# Patient Record
Sex: FEMALE | Race: WHITE | NOT HISPANIC OR LATINO
[De-identification: names, ages, dates, MRNs, and addresses within clinical notes are randomized per-mention and may not be internally consistent; named-entity substitution may affect disease eponyms.]

---

## 2017-01-13 ENCOUNTER — APPOINTMENT (OUTPATIENT)
Dept: INTERNAL MEDICINE | Facility: CLINIC | Age: 67
End: 2017-01-13

## 2017-04-14 ENCOUNTER — NON-APPOINTMENT (OUTPATIENT)
Age: 67
End: 2017-04-14

## 2017-04-14 ENCOUNTER — LABORATORY RESULT (OUTPATIENT)
Age: 67
End: 2017-04-14

## 2017-04-14 ENCOUNTER — APPOINTMENT (OUTPATIENT)
Dept: INTERNAL MEDICINE | Facility: CLINIC | Age: 67
End: 2017-04-14

## 2017-04-14 VITALS
BODY MASS INDEX: 27.63 KG/M2 | OXYGEN SATURATION: 98 % | DIASTOLIC BLOOD PRESSURE: 70 MMHG | SYSTOLIC BLOOD PRESSURE: 110 MMHG | HEIGHT: 62.5 IN | HEART RATE: 76 BPM | WEIGHT: 154 LBS | TEMPERATURE: 98.2 F

## 2017-04-14 DIAGNOSIS — Z87.09 PERSONAL HISTORY OF OTHER DISEASES OF THE RESPIRATORY SYSTEM: ICD-10-CM

## 2017-04-14 DIAGNOSIS — Z86.69 PERSONAL HISTORY OF OTHER DISEASES OF THE NERVOUS SYSTEM AND SENSE ORGANS: ICD-10-CM

## 2017-04-14 DIAGNOSIS — J06.9 ACUTE UPPER RESPIRATORY INFECTION, UNSPECIFIED: ICD-10-CM

## 2017-04-14 DIAGNOSIS — N39.0 URINARY TRACT INFECTION, SITE NOT SPECIFIED: ICD-10-CM

## 2017-04-14 LAB
CREAT SPEC-SCNC: NORMAL
GLUCOSE UR-MCNC: NORMAL
HGB UR QL STRIP.AUTO: NORMAL
KETONES UR-MCNC: NORMAL
LEUKOCYTE ESTERASE UR QL STRIP: NORMAL
NITRITE UR QL STRIP: NORMAL
PH UR STRIP: 5.5
PROT UR STRIP-MCNC: NORMAL
SP GR UR STRIP: >=1.03

## 2017-04-15 VITALS — SYSTOLIC BLOOD PRESSURE: 120 MMHG | DIASTOLIC BLOOD PRESSURE: 68 MMHG

## 2017-04-17 ENCOUNTER — TRANSCRIPTION ENCOUNTER (OUTPATIENT)
Age: 67
End: 2017-04-17

## 2017-04-17 LAB
25(OH)D3 SERPL-MCNC: 31.6 NG/ML
ALBUMIN SERPL ELPH-MCNC: 3.9 G/DL
ALP BLD-CCNC: 51 U/L
ALT SERPL-CCNC: 16 U/L
ANION GAP SERPL CALC-SCNC: 13 MMOL/L
AST SERPL-CCNC: 25 U/L
BASOPHILS # BLD AUTO: 0.02 K/UL
BASOPHILS NFR BLD AUTO: 0.9 %
BILIRUB SERPL-MCNC: 0.6 MG/DL
BUN SERPL-MCNC: 20 MG/DL
CALCIUM SERPL-MCNC: 9.3 MG/DL
CHLORIDE SERPL-SCNC: 104 MMOL/L
CHOLEST SERPL-MCNC: 182 MG/DL
CHOLEST/HDLC SERPL: 3.6 RATIO
CO2 SERPL-SCNC: 26 MMOL/L
CREAT SERPL-MCNC: 0.83 MG/DL
EOSINOPHIL # BLD AUTO: 0.02 K/UL
EOSINOPHIL NFR BLD AUTO: 0.9 %
GLUCOSE SERPL-MCNC: 93 MG/DL
HBA1C MFR BLD HPLC: 5.8 %
HCT VFR BLD CALC: 37 %
HDLC SERPL-MCNC: 50 MG/DL
HGB BLD-MCNC: 11.5 G/DL
LDLC SERPL CALC-MCNC: 107 MG/DL
LYMPHOCYTES # BLD AUTO: 0.88 K/UL
LYMPHOCYTES NFR BLD AUTO: 35.4 %
MAN DIFF?: NORMAL
MCHC RBC-ENTMCNC: 28.5 PG
MCHC RBC-ENTMCNC: 31.1 GM/DL
MCV RBC AUTO: 91.8 FL
MONOCYTES # BLD AUTO: 0.07 K/UL
MONOCYTES NFR BLD AUTO: 2.7 %
NEUTROPHILS # BLD AUTO: 1.45 K/UL
NEUTROPHILS NFR BLD AUTO: 58.3 %
PLATELET # BLD AUTO: 239 K/UL
POTASSIUM SERPL-SCNC: 3.7 MMOL/L
PROT SERPL-MCNC: 7.5 G/DL
RBC # BLD: 4.03 M/UL
RBC # FLD: 13.7 %
SODIUM SERPL-SCNC: 143 MMOL/L
T4 FREE SERPL-MCNC: 0.9 NG/DL
TRIGL SERPL-MCNC: 124 MG/DL
TSH SERPL-ACNC: 1.26 UIU/ML
WBC # FLD AUTO: 2.49 K/UL

## 2017-04-26 ENCOUNTER — APPOINTMENT (OUTPATIENT)
Dept: INTERNAL MEDICINE | Facility: CLINIC | Age: 67
End: 2017-04-26

## 2017-04-26 VITALS
TEMPERATURE: 98.3 F | HEART RATE: 86 BPM | OXYGEN SATURATION: 98 % | DIASTOLIC BLOOD PRESSURE: 60 MMHG | SYSTOLIC BLOOD PRESSURE: 110 MMHG

## 2017-04-26 RX ORDER — MILK THISTLE FRUIT EXTRACT 140 MG
CAPSULE ORAL
Refills: 0 | Status: ACTIVE | COMMUNITY

## 2017-04-27 ENCOUNTER — TRANSCRIPTION ENCOUNTER (OUTPATIENT)
Age: 67
End: 2017-04-27

## 2017-05-05 ENCOUNTER — LABORATORY RESULT (OUTPATIENT)
Age: 67
End: 2017-05-05

## 2017-06-14 ENCOUNTER — MEDICATION RENEWAL (OUTPATIENT)
Age: 67
End: 2017-06-14

## 2017-06-16 ENCOUNTER — TRANSCRIPTION ENCOUNTER (OUTPATIENT)
Age: 67
End: 2017-06-16

## 2017-12-28 ENCOUNTER — APPOINTMENT (OUTPATIENT)
Dept: INTERNAL MEDICINE | Facility: CLINIC | Age: 67
End: 2017-12-28
Payer: MEDICARE

## 2017-12-28 VITALS
SYSTOLIC BLOOD PRESSURE: 110 MMHG | BODY MASS INDEX: 26.91 KG/M2 | TEMPERATURE: 97.8 F | DIASTOLIC BLOOD PRESSURE: 70 MMHG | HEIGHT: 62.5 IN | HEART RATE: 68 BPM | WEIGHT: 150 LBS | OXYGEN SATURATION: 99 %

## 2017-12-28 PROCEDURE — 99213 OFFICE O/P EST LOW 20 MIN: CPT

## 2018-04-13 ENCOUNTER — APPOINTMENT (OUTPATIENT)
Dept: INTERNAL MEDICINE | Facility: CLINIC | Age: 68
End: 2018-04-13
Payer: MEDICARE

## 2018-04-13 ENCOUNTER — NON-APPOINTMENT (OUTPATIENT)
Age: 68
End: 2018-04-13

## 2018-04-13 VITALS
DIASTOLIC BLOOD PRESSURE: 68 MMHG | SYSTOLIC BLOOD PRESSURE: 104 MMHG | OXYGEN SATURATION: 99 % | WEIGHT: 150 LBS | HEART RATE: 72 BPM | RESPIRATION RATE: 14 BRPM | TEMPERATURE: 97.9 F | BODY MASS INDEX: 27 KG/M2

## 2018-04-13 DIAGNOSIS — Z80.0 FAMILY HISTORY OF MALIGNANT NEOPLASM OF DIGESTIVE ORGANS: ICD-10-CM

## 2018-04-13 DIAGNOSIS — M25.569 PAIN IN UNSPECIFIED KNEE: ICD-10-CM

## 2018-04-13 DIAGNOSIS — Z29.8 ENCOUNTER FOR OTHER SPECIFIED PROPHYLACTIC MEASURES: ICD-10-CM

## 2018-04-13 DIAGNOSIS — G47.33 OBSTRUCTIVE SLEEP APNEA (ADULT) (PEDIATRIC): ICD-10-CM

## 2018-04-13 DIAGNOSIS — Z86.2 PERSONAL HISTORY OF DISEASES OF THE BLOOD AND BLOOD-FORMING ORGANS AND CERTAIN DISORDERS INVOLVING THE IMMUNE MECHANISM: ICD-10-CM

## 2018-04-13 DIAGNOSIS — H00.019 HORDEOLUM EXTERNUM UNSPECIFIED EYE, UNSPECIFIED EYELID: ICD-10-CM

## 2018-04-13 PROCEDURE — G0439: CPT

## 2018-04-13 PROCEDURE — 36415 COLL VENOUS BLD VENIPUNCTURE: CPT

## 2018-04-13 PROCEDURE — 93000 ELECTROCARDIOGRAM COMPLETE: CPT | Mod: 59

## 2018-04-13 RX ORDER — TOBRAMYCIN AND DEXAMETHASONE 3; 1 MG/ML; MG/ML
0.3-0.1 SUSPENSION/ DROPS OPHTHALMIC 4 TIMES DAILY
Qty: 1 | Refills: 0 | Status: DISCONTINUED | COMMUNITY
Start: 2017-04-19 | End: 2018-04-13

## 2018-04-13 RX ORDER — ATOVAQUONE AND PROGUANIL HYDROCHLORIDE 250; 100 MG/1; MG/1
250-100 TABLET, FILM COATED ORAL
Qty: 19 | Refills: 0 | Status: DISCONTINUED | COMMUNITY
Start: 2017-06-14 | End: 2018-04-13

## 2018-07-09 PROBLEM — Z80.0 FAMILY HISTORY OF MALIGNANT NEOPLASM OF COLON: Status: ACTIVE | Noted: 2018-07-09

## 2018-07-09 LAB
25(OH)D3 SERPL-MCNC: 27.9 NG/ML
ALBUMIN SERPL ELPH-MCNC: 3.9 G/DL
ALP BLD-CCNC: 53 U/L
ALT SERPL-CCNC: 15 U/L
ANION GAP SERPL CALC-SCNC: 10 MMOL/L
APPEARANCE: CLEAR
AST SERPL-CCNC: 24 U/L
BACTERIA: NEGATIVE
BASOPHILS # BLD AUTO: 0.02 K/UL
BASOPHILS NFR BLD AUTO: 0.4 %
BILIRUB SERPL-MCNC: 0.5 MG/DL
BILIRUBIN URINE: NEGATIVE
BLOOD URINE: NEGATIVE
BUN SERPL-MCNC: 21 MG/DL
CALCIUM SERPL-MCNC: 9.6 MG/DL
CHLORIDE SERPL-SCNC: 103 MMOL/L
CHOLEST SERPL-MCNC: 209 MG/DL
CHOLEST/HDLC SERPL: 3.9 RATIO
CO2 SERPL-SCNC: 30 MMOL/L
COLOR: YELLOW
CREAT SERPL-MCNC: 1 MG/DL
EOSINOPHIL # BLD AUTO: 0.12 K/UL
EOSINOPHIL NFR BLD AUTO: 2.7 %
GLUCOSE QUALITATIVE U: NEGATIVE MG/DL
GLUCOSE SERPL-MCNC: 83 MG/DL
HBA1C MFR BLD HPLC: 5.7 %
HCT VFR BLD CALC: 37.7 %
HCV AB SER QL: NONREACTIVE
HCV S/CO RATIO: 0.18 S/CO
HDLC SERPL-MCNC: 53 MG/DL
HGB BLD-MCNC: 12 G/DL
HYALINE CASTS: 3 /LPF
IMM GRANULOCYTES NFR BLD AUTO: 0.2 %
KETONES URINE: NEGATIVE
LDLC SERPL CALC-MCNC: 133 MG/DL
LEUKOCYTE ESTERASE URINE: ABNORMAL
LYMPHOCYTES # BLD AUTO: 1.4 K/UL
LYMPHOCYTES NFR BLD AUTO: 31 %
MAN DIFF?: NORMAL
MCHC RBC-ENTMCNC: 29 PG
MCHC RBC-ENTMCNC: 31.8 GM/DL
MCV RBC AUTO: 91.1 FL
MICROSCOPIC-UA: NORMAL
MONOCYTES # BLD AUTO: 0.4 K/UL
MONOCYTES NFR BLD AUTO: 8.9 %
NEUTROPHILS # BLD AUTO: 2.56 K/UL
NEUTROPHILS NFR BLD AUTO: 56.8 %
NITRITE URINE: NEGATIVE
PH URINE: 7.5
PLATELET # BLD AUTO: 252 K/UL
POTASSIUM SERPL-SCNC: 4.8 MMOL/L
PROT SERPL-MCNC: 7.7 G/DL
PROTEIN URINE: NEGATIVE MG/DL
RBC # BLD: 4.14 M/UL
RBC # FLD: 13.4 %
RED BLOOD CELLS URINE: 1 /HPF
SODIUM SERPL-SCNC: 143 MMOL/L
SPECIFIC GRAVITY URINE: 1.02
SQUAMOUS EPITHELIAL CELLS: 3 /HPF
T4 FREE SERPL-MCNC: 0.9 NG/DL
TRIGL SERPL-MCNC: 117 MG/DL
TSH SERPL-ACNC: 1.06 UIU/ML
UROBILINOGEN URINE: NEGATIVE MG/DL
WBC # FLD AUTO: 4.51 K/UL
WHITE BLOOD CELLS URINE: 7 /HPF

## 2018-07-12 ENCOUNTER — APPOINTMENT (OUTPATIENT)
Dept: INTERNAL MEDICINE | Facility: CLINIC | Age: 68
End: 2018-07-12
Payer: MEDICARE

## 2018-07-12 VITALS
TEMPERATURE: 98 F | OXYGEN SATURATION: 98 % | DIASTOLIC BLOOD PRESSURE: 60 MMHG | BODY MASS INDEX: 26.91 KG/M2 | SYSTOLIC BLOOD PRESSURE: 110 MMHG | WEIGHT: 150 LBS | HEART RATE: 96 BPM | HEIGHT: 62.5 IN
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
## 2018-07-12 PROCEDURE — 99214 OFFICE O/P EST MOD 30 MIN: CPT

## 2018-07-13 VITALS — SYSTOLIC BLOOD PRESSURE: 110 MMHG | DIASTOLIC BLOOD PRESSURE: 72 MMHG

## 2018-07-13 NOTE — PHYSICAL EXAM
[No Acute Distress] : no acute distress [Well Nourished] : well nourished [Well Developed] : well developed [Normal Sclera/Conjunctiva] : normal sclera/conjunctiva [PERRL] : pupils equal round and reactive to light [EOMI] : extraocular movements intact [Normal Outer Ear/Nose] : the outer ears and nose were normal in appearance [No JVD] : no jugular venous distention [Supple] : supple [No Lymphadenopathy] : no lymphadenopathy [No Respiratory Distress] : no respiratory distress  [Clear to Auscultation] : lungs were clear to auscultation bilaterally [Normal Rate] : normal rate  [Regular Rhythm] : with a regular rhythm [Normal S1, S2] : normal S1 and S2 [Pedal Pulses Present] : the pedal pulses are present [No Edema] : there was no peripheral edema [de-identified] : Ecchymoses of the chin and around the left eye appear to be healing.  No open wounds.  Small lacerations on her hands.

## 2018-07-13 NOTE — ASSESSMENT
[FreeTextEntry1] : The patient is s/p a mechanical fall nine days ago.  She hit her face and has bruising bur fortunately no serious injuries.  She appears well. No neurological symptoms.  She should monitor and call immediately for headache, nausea, vomiting or focal neurological symptoms.\par \par She says that in general her balance isn't as good and we agreed a trial of PT might be helpful.\par \par She had a recent colonoscopy that was reviewed.  No serious pathology. Done for possible change in bowel habits.  \par \par Blood tests from the last visit reviewed.  Lipids fair.  She saw a nutritionist, Ania Rajan, this week.  Watch the diet and check in 4-6 months.  Could consider a statin.  HgBA1c 5.7.  Vitamin D slightly low.

## 2018-07-13 NOTE — HISTORY OF PRESENT ILLNESS
[de-identified] : The patient comes in to follow-up after her recent fall.  She was walking on a sidewalk and there was an uneven section.  She was walking quickly and didn't see it.  She tripped and fell forward and hit her face.  She got her hands in front of her but not enough to break her fall.  No dizziness, lightheadedness, chest pain, dyspnea, LOC.  She was able to get up on her own.  She had bruising of her face, temple and chin.  She had scratches on her hands.  \par \par Four days ago she wasn't sure if she was healing sufficiently so she went to an urgent care facility.  No new intervention.  \par \par She is now feeling better.  No headache, change in vision, nausea, vomiting, fever, jaw or teeth problems.  She hasn't needed pain medicine.

## 2019-08-13 ENCOUNTER — TRANSCRIPTION ENCOUNTER (OUTPATIENT)
Age: 69
End: 2019-08-13

## 2020-01-15 ENCOUNTER — APPOINTMENT (OUTPATIENT)
Dept: PULMONOLOGY | Facility: CLINIC | Age: 70
End: 2020-01-15

## 2020-02-10 ENCOUNTER — APPOINTMENT (OUTPATIENT)
Dept: PULMONOLOGY | Facility: CLINIC | Age: 70
End: 2020-02-10

## 2020-03-23 ENCOUNTER — TRANSCRIPTION ENCOUNTER (OUTPATIENT)
Age: 70
End: 2020-03-23

## 2020-05-21 ENCOUNTER — APPOINTMENT (OUTPATIENT)
Dept: PULMONOLOGY | Facility: CLINIC | Age: 70
End: 2020-05-21

## 2021-03-31 ENCOUNTER — APPOINTMENT (OUTPATIENT)
Dept: INTERNAL MEDICINE | Facility: CLINIC | Age: 71
End: 2021-03-31
Payer: MEDICARE

## 2021-03-31 VITALS
HEART RATE: 82 BPM | HEIGHT: 62.5 IN | BODY MASS INDEX: 27.99 KG/M2 | OXYGEN SATURATION: 98 % | DIASTOLIC BLOOD PRESSURE: 68 MMHG | SYSTOLIC BLOOD PRESSURE: 112 MMHG | WEIGHT: 156 LBS | TEMPERATURE: 97.2 F

## 2021-03-31 PROCEDURE — 99215 OFFICE O/P EST HI 40 MIN: CPT

## 2021-03-31 NOTE — HISTORY OF PRESENT ILLNESS
[FreeTextEntry8] : Ms. ARMIDA ESTRADA is a 70 year old woman with pmhx of obesity, vitamin d deficiency, osteoporosis who presents for an acute visit. She endorses hitting her head when putting down the trunk of her car. She had an abrasion from this. She visit urgent care. \par \par She endorses prior to the accident she may have had bumps in her forehead. She endorses they are extremity itchy and she has been scratching. She has developed multiple bumps since.

## 2021-03-31 NOTE — ASSESSMENT
[FreeTextEntry1] : She will have blood work prior to AWV appointment with Dr. Lawrence. Mammography referral provided. Documented covid vaccine. Advised going to pharmacy for shingles vaccine. Colonoscopy due on 2023. Declines flu and pneumovax. \par \par Provided hydrocortisone cream for itchiness. \par \par PHQ9- elevated. Likely situational due to covid. No SI. Follow up on CPE appointment.

## 2021-03-31 NOTE — PHYSICAL EXAM
[No Acute Distress] : no acute distress [Well Nourished] : well nourished [Normal Sclera/Conjunctiva] : normal sclera/conjunctiva [EOMI] : extraocular movements intact [No Respiratory Distress] : no respiratory distress  [No Accessory Muscle Use] : no accessory muscle use [de-identified] : Small raised lesions in the forehead and inside the hairline.

## 2021-06-03 ENCOUNTER — NON-APPOINTMENT (OUTPATIENT)
Age: 71
End: 2021-06-03

## 2021-06-03 ENCOUNTER — APPOINTMENT (OUTPATIENT)
Dept: INTERNAL MEDICINE | Facility: CLINIC | Age: 71
End: 2021-06-03
Payer: MEDICARE

## 2021-06-03 VITALS
HEART RATE: 69 BPM | HEIGHT: 62 IN | WEIGHT: 155 LBS | OXYGEN SATURATION: 98 % | BODY MASS INDEX: 28.52 KG/M2 | TEMPERATURE: 97.2 F

## 2021-06-03 DIAGNOSIS — S00.81XA ABRASION OF OTHER PART OF HEAD, INITIAL ENCOUNTER: ICD-10-CM

## 2021-06-03 DIAGNOSIS — Z23 ENCOUNTER FOR IMMUNIZATION: ICD-10-CM

## 2021-06-03 DIAGNOSIS — R21 RASH AND OTHER NONSPECIFIC SKIN ERUPTION: ICD-10-CM

## 2021-06-03 DIAGNOSIS — Z12.83 ENCOUNTER FOR SCREENING FOR MALIGNANT NEOPLASM OF SKIN: ICD-10-CM

## 2021-06-03 DIAGNOSIS — Z12.39 ENCOUNTER FOR OTHER SCREENING FOR MALIGNANT NEOPLASM OF BREAST: ICD-10-CM

## 2021-06-03 DIAGNOSIS — M79.2 NEURALGIA AND NEURITIS, UNSPECIFIED: ICD-10-CM

## 2021-06-03 DIAGNOSIS — G47.33 OBSTRUCTIVE SLEEP APNEA (ADULT) (PEDIATRIC): ICD-10-CM

## 2021-06-03 PROCEDURE — G0439: CPT

## 2021-06-03 PROCEDURE — 93000 ELECTROCARDIOGRAM COMPLETE: CPT | Mod: 59

## 2021-06-03 PROCEDURE — G0444 DEPRESSION SCREEN ANNUAL: CPT | Mod: 59

## 2021-06-03 RX ORDER — MUPIROCIN 20 MG/G
2 OINTMENT TOPICAL 3 TIMES DAILY
Qty: 1 | Refills: 0 | Status: DISCONTINUED | COMMUNITY
Start: 2021-03-31 | End: 2021-06-03

## 2021-06-03 RX ORDER — HYDROCORTISONE 25 MG/G
2.5 CREAM TOPICAL 3 TIMES DAILY
Qty: 15 | Refills: 0 | Status: DISCONTINUED | COMMUNITY
Start: 2021-03-31 | End: 2021-06-03

## 2021-06-03 RX ORDER — NYSTATIN 100000 U/G
100000 OINTMENT TOPICAL 3 TIMES DAILY
Qty: 1 | Refills: 1 | Status: DISCONTINUED | COMMUNITY
Start: 2020-03-23 | End: 2021-06-03

## 2021-06-09 NOTE — PHYSICAL EXAM
[No Acute Distress] : no acute distress [Well Nourished] : well nourished [Well Developed] : well developed [Well-Appearing] : well-appearing [Normal Sclera/Conjunctiva] : normal sclera/conjunctiva [PERRL] : pupils equal round and reactive to light [EOMI] : extraocular movements intact [Normal Outer Ear/Nose] : the outer ears and nose were normal in appearance [Normal Oropharynx] : the oropharynx was normal [No JVD] : no jugular venous distention [No Lymphadenopathy] : no lymphadenopathy [Supple] : supple [Thyroid Normal, No Nodules] : the thyroid was normal and there were no nodules present [No Respiratory Distress] : no respiratory distress  [No Accessory Muscle Use] : no accessory muscle use [Clear to Auscultation] : lungs were clear to auscultation bilaterally [Normal Rate] : normal rate  [Regular Rhythm] : with a regular rhythm [Normal S1, S2] : normal S1 and S2 [No Murmur] : no murmur heard [No Carotid Bruits] : no carotid bruits [No Abdominal Bruit] : a ~M bruit was not heard ~T in the abdomen [No Varicosities] : no varicosities [Pedal Pulses Present] : the pedal pulses are present [No Edema] : there was no peripheral edema [No Palpable Aorta] : no palpable aorta [No Extremity Clubbing/Cyanosis] : no extremity clubbing/cyanosis [Normal Appearance] : normal in appearance [No Nipple Discharge] : no nipple discharge [No Axillary Lymphadenopathy] : no axillary lymphadenopathy [Soft] : abdomen soft [Non Tender] : non-tender [Non-distended] : non-distended [No Masses] : no abdominal mass palpated [No HSM] : no HSM [Normal Bowel Sounds] : normal bowel sounds [Normal Posterior Cervical Nodes] : no posterior cervical lymphadenopathy [Normal Anterior Cervical Nodes] : no anterior cervical lymphadenopathy [No CVA Tenderness] : no CVA  tenderness [No Spinal Tenderness] : no spinal tenderness [No Joint Swelling] : no joint swelling [Grossly Normal Strength/Tone] : grossly normal strength/tone [No Rash] : no rash [No Focal Deficits] : no focal deficits [Coordination Grossly Intact] : coordination grossly intact [Normal Gait] : normal gait [Deep Tendon Reflexes (DTR)] : deep tendon reflexes were 2+ and symmetric [Normal Affect] : the affect was normal [Normal Insight/Judgement] : insight and judgment were intact [de-identified] : 120/70

## 2021-06-09 NOTE — HISTORY OF PRESENT ILLNESS
[FreeTextEntry1] : The patient is here for a routine visit.  [de-identified] : Her diet has been ok.  She does some exercise.  No chest pain or dyspnea.  \par \par She has GI symptoms- she feels the need to have a BM and she has gas.  No BRBPR except she can have slight blood when she walks. No abdominal pain, N/V, weight loss.  \par \par Her left arm was bothersome which she feels is from the COVID-19 vaccine.  It has been sore but seems better now.

## 2021-06-09 NOTE — HEALTH RISK ASSESSMENT
[No] : No [0] : 2) Feeling down, depressed, or hopeless: Not at all (0) [Fully functional (bathing, dressing, toileting, transferring, walking, feeding)] : Fully functional (bathing, dressing, toileting, transferring, walking, feeding) [Fully functional (using the telephone, shopping, preparing meals, housekeeping, doing laundry, using] : Fully functional and needs no help or supervision to perform IADLs (using the telephone, shopping, preparing meals, housekeeping, doing laundry, using transportation, managing medications and managing finances) [] : No [[id number]] : 0 [Reports changes in hearing] : Reports no changes in hearing [Reports changes in vision] : Reports no changes in vision [Reports changes in dental health] : Reports no changes in dental health

## 2021-06-09 NOTE — ASSESSMENT
[FreeTextEntry1] : Blood tests reviewed- lipids fair- discussed diet, exercise and monitor.  CHeck lipids in three months. She is borderline to start a statin- 8.8% ten year risk.  She doesn't want to start a statin now- check in 3-4 months.  BP fine at 120/70. \par \par She has gastrointestinal symptoms of gas, need to have a BM, occasional BRBPR.  Colonoscopy was in 5/18 and a five year follow-up advised.  She is seeing her gastroenterologist, Dr. Green and she will have the report sent.\par \par Note she isn't sure if the blood is from a GI or gyn source and she was advised to see her gynecologist as well to rule out pathology.  She hasn't recently see a gyn.\par \par Mammogram was fine but breast U/S also advised given dense breasts.\par \par She has had the COVID-19 vaccine.\par \par She declines other vaccines. \par \par The left arm pain might be radicular.  She can try PT.  Consider an orthopedics or neurology evaluation if it persists. \par \par DEXA 7/20.  She has seen Dr. Archibald for the osteoporosis. She took Prolia but stopped it.\par \par She was advised to see an ophthalmologist.

## 2021-08-31 ENCOUNTER — NON-APPOINTMENT (OUTPATIENT)
Age: 71
End: 2021-08-31

## 2021-09-02 ENCOUNTER — APPOINTMENT (OUTPATIENT)
Dept: PULMONOLOGY | Facility: CLINIC | Age: 71
End: 2021-09-02
Payer: MEDICARE

## 2021-09-02 PROCEDURE — 99203 OFFICE O/P NEW LOW 30 MIN: CPT | Mod: 95

## 2021-09-03 NOTE — ASSESSMENT
[FreeTextEntry1] : 71 y/o F with overall mild symptomatic NOE, moderate in supine REM position (AHI 8.8/hr; REM AHI 24/hr) presents to reestablish care to obtain a new CPAP given the recall on her current CPAP. \par Therapeutic and compliance data download reveals usage 73.3% of days with an average use of 5 hours and 7 minutes. Therapy based AHI is 7/hr on 13cm H2O, with no significant mask leak. \par She stopped using the recalled CPAP for 1-week and endorses a recurrence of drowsy driving, requiring her to pull over to the side, fatigue, and overall poor daytime functional status.\par \par RECALL\par Informed patient on the recall of the DS PAP device is related to the polyester-based polyurethane (PE-PUR) sound abatement foam used in these devices. The potential harm of inhalation or ingestion of the foam particles and associated risks were discussed in detail with the patient. The ramifications associated with untreated NOE were discussed at length with the patient as well. She has already registered her PAP device with [employer] to be serviced. \par \par Her PAP was set- up in 07/2016 and she is eligible for a new PAP device under her insurance.. \par \par PLAN:\par ---Rx for Resmed  APAP on pressures 8-20 cmH20 for residual AHI of 7 on 13 cmH20 was placed with [employer], [email or web address].\par ---Patient wishes to continue using her recalled CPAP device despite verbalizing full understanding of the associated risks with continuing therapy with the recalled device until she receives a new PAP through her insurance, for significant decline of her energy levels and daytime functioning without using CPAP for 1- week  ---Patient was counseled to not use ozone-related cleaning products and adhere to their device Instructions for approved cleaning methods. \par ----Mask fitting Referral for neck pain associated with headgear from her full face mask. \par ----The patient was warned against drowsy driving. \par \par F/U  35-60 days from receiving CPAP.

## 2021-09-03 NOTE — REVIEW OF SYSTEMS
[Fatigue] : fatigue [Arthralgias] : arthralgias [Unintentional Sleep while inactive] : unintentional sleep while inactive [Nasal Congestion] : no nasal congestion [Postnasal Drip] : no postnasal drip [Shortness Of Breath] : no shortness of breath [A.M. Dry Mouth] : no a.m. dry mouth [Chest Pain] : no chest pain [CHF] : no congestive heart failure [Thyroid Disease] : no thyroid disease [Diabetes] : no diabetes  [A.M. Headache] : no headache present upon awakening [History of Iron Deficiency] : no history of iron deficiency [Heartburn] : no heartburn [Nocturia] : no nocturia [Depression] : no depression [Anxious] : not anxious [Snoring] : no snoring [Witnessed Apneas] : demonstrated no ~M apnea [Frequent Nocturnal Awakenings] : no nocturnal awakenings from sleep [Daytime Somnolence: ESS=____] : no daytime somnolence [Unintentional Sleep while active] : no unintentional sleep while active [Awakes Unrefreshed] : restorative sleep [Awakes With Headache] : awakes without a headache [Awakes With Dry Mouth] : awakes without dry mouth [Recent Wt Loss (___ Lbs)] : no recent weight loss [Recent Wt Gain (___ Lbs)] : no recent weight gain [Difficulty Initiating Sleep] : no difficulty falling asleep [Irresistible urge to move legs] : no irresistible urge to move legs because of lower extremity discomfort [Difficulty Maintaining Sleep] : no difficulty maintaining sleep [LE discomfort relieved by movement] : lower extremity discomfort not relieved by movement [Unusual Sleep Behavior] : no unusual sleep behavior [Hypersomnolence] : not sleeping much more than usual [Cataplexy] :  no cataplexy [Hypnogogic Hallucinations] : no hypnogogic hallucinations [Hypnopompic Hallucinations] : no hypnopompic hallucinations [Sleep Paralysis] : no sleep paralysis [FreeTextEntry1] : 12 - 1 AM [FreeTextEntry2] : 7 - 7:30 AM [FreeTextEntry3] : "very fast" [FreeTextEntry4] : 1x [FreeTextEntry5] : "quick" [FreeTextEntry6] : 7- hours [FreeTextEntry7] : occasional, but tries not to stating she cannot sleep at night if she does.

## 2021-09-03 NOTE — PHYSICAL EXAM
[Normal Appearance] : normal appearance [General Appearance - In No Acute Distress] : no acute distress [] : no respiratory distress [Exaggerated Use Of Accessory Muscles For Inspiration] : no accessory muscle use [Oriented To Time, Place, And Person] : oriented to person, place, and time [Affect] : the affect was normal [Mood] : the mood was normal [Neck Appearance] : the appearance of the neck was normal

## 2021-09-03 NOTE — ASSESSMENT
[FreeTextEntry1] : 71 y/o F with overall mild symptomatic NOE, moderate in supine REM position (AHI 8.8/hr; REM AHI 24/hr) presents to reestablish care to obtain a new CPAP given the recall on her current CPAP. \par Therapeutic and compliance data download reveals usage 73.3% of days with an average use of 5 hours and 7 minutes. Therapy based AHI is 7/hr on 13cm H2O, with no significant mask leak. \par She stopped using the recalled CPAP for 1-week and endorses a recurrence of drowsy driving, requiring her to pull over to the side, fatigue, and overall poor daytime functional status.\par \par RECALL\par Informed patient on the recall of the DS PAP device is related to the polyester-based polyurethane (PE-PUR) sound abatement foam used in these devices. The potential harm of inhalation or ingestion of the foam particles and associated risks were discussed in detail with the patient. The ramifications associated with untreated NOE were discussed at length with the patient as well. She has already registered her PAP device with [employer] to be serviced. \par \par Her PAP was set- up in 07/2016 and she is eligible for a new PAP device under her insurance.. \par \par PLAN:\par ---Rx for Resmed  APAP on pressures 8-20 cmH20 for residual AHI of 7 on 13 cmH20 was placed with [employer] company, [employer].\par ---Patient wishes to continue using her recalled CPAP device despite verbalizing full understanding of the associated risks with continuing therapy with the recalled device until she receives a new PAP through her insurance, for significant decline of her energy levels and daytime functioning without using CPAP for 1- week  ---Patient was counseled to not use ozone-related cleaning products and adhere to their device Instructions for approved cleaning methods. \par ----Mask fitting Referral for neck pain associated with headgear from her full face mask. \par ----The patient was warned against drowsy driving. \par \par F/U  35-60 days from receiving CPAP.
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
## 2021-09-03 NOTE — REVIEW OF SYSTEMS
[Fatigue] : fatigue [Arthralgias] : arthralgias [Unintentional Sleep while inactive] : unintentional sleep while inactive [Nasal Congestion] : no nasal congestion [Postnasal Drip] : no postnasal drip [Shortness Of Breath] : no shortness of breath [A.M. Dry Mouth] : no a.m. dry mouth [Chest Pain] : no chest pain [CHF] : no congestive heart failure [Thyroid Disease] : no thyroid disease [Diabetes] : no diabetes  [History of Iron Deficiency] : no history of iron deficiency [A.M. Headache] : no headache present upon awakening [Heartburn] : no heartburn [Nocturia] : no nocturia [Depression] : no depression [Anxious] : not anxious [Snoring] : no snoring [Witnessed Apneas] : demonstrated no ~M apnea [Frequent Nocturnal Awakenings] : no nocturnal awakenings from sleep [Daytime Somnolence: ESS=____] : no daytime somnolence [Unintentional Sleep while active] : no unintentional sleep while active [Awakes Unrefreshed] : restorative sleep [Awakes With Headache] : awakes without a headache [Awakes With Dry Mouth] : awakes without dry mouth [Recent Wt Loss (___ Lbs)] : no recent weight loss [Recent Wt Gain (___ Lbs)] : no recent weight gain [Difficulty Initiating Sleep] : no difficulty falling asleep [Difficulty Maintaining Sleep] : no difficulty maintaining sleep [Irresistible urge to move legs] : no irresistible urge to move legs because of lower extremity discomfort [LE discomfort relieved by movement] : lower extremity discomfort not relieved by movement [Unusual Sleep Behavior] : no unusual sleep behavior [Hypersomnolence] : not sleeping much more than usual [Cataplexy] :  no cataplexy [Hypnogogic Hallucinations] : no hypnogogic hallucinations [Hypnopompic Hallucinations] : no hypnopompic hallucinations [Sleep Paralysis] : no sleep paralysis [FreeTextEntry1] : 12 - 1 AM [FreeTextEntry2] : 7 - 7:30 AM [FreeTextEntry3] : "very fast" [FreeTextEntry4] : 1x [FreeTextEntry6] : 7- hours [FreeTextEntry5] : "quick" [FreeTextEntry7] : occasional, but tries not to stating she cannot sleep at night if she does.
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
## 2021-09-03 NOTE — REASON FOR VISIT
[Home] : at home, [unfilled] , at the time of the visit. [Medical Office: ([facility])___] : at the medical office located in  [Verbal consent obtained from patient] : the patient, [unfilled]

## 2022-03-24 ENCOUNTER — APPOINTMENT (OUTPATIENT)
Dept: PULMONOLOGY | Facility: CLINIC | Age: 72
End: 2022-03-24
Payer: MEDICARE

## 2022-03-24 VITALS
WEIGHT: 157 LBS | DIASTOLIC BLOOD PRESSURE: 70 MMHG | RESPIRATION RATE: 15 BRPM | BODY MASS INDEX: 28.89 KG/M2 | HEIGHT: 62 IN | TEMPERATURE: 97 F | SYSTOLIC BLOOD PRESSURE: 114 MMHG | OXYGEN SATURATION: 98 % | HEART RATE: 60 BPM

## 2022-03-24 DIAGNOSIS — F51.12 INSUFFICIENT SLEEP SYNDROME: ICD-10-CM

## 2022-03-24 PROCEDURE — 99215 OFFICE O/P EST HI 40 MIN: CPT
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
## 2022-03-24 NOTE — REVIEW OF SYSTEMS
[Fatigue] : fatigue [Arthralgias] : arthralgias [Unintentional Sleep while inactive] : unintentional sleep while inactive [Hypersomnolence] : sleeping much more than usual [Nasal Congestion] : no nasal congestion [Postnasal Drip] : no postnasal drip [Shortness Of Breath] : no shortness of breath [A.M. Dry Mouth] : no a.m. dry mouth [Chest Pain] : no chest pain [CHF] : no congestive heart failure [Thyroid Disease] : no thyroid disease [Diabetes] : no diabetes  [History of Iron Deficiency] : no history of iron deficiency [A.M. Headache] : no headache present upon awakening [Heartburn] : no heartburn [Nocturia] : no nocturia [Depression] : no depression [Anxious] : not anxious [Snoring] : no snoring [Witnessed Apneas] : demonstrated no ~M apnea [Frequent Nocturnal Awakenings] : no nocturnal awakenings from sleep [Daytime Somnolence: ESS=____] : no daytime somnolence [Unintentional Sleep while active] : no unintentional sleep while active [Awakes Unrefreshed] : restorative sleep [Awakes With Headache] : awakes without a headache [Awakes With Dry Mouth] : awakes without dry mouth [Recent Wt Loss (___ Lbs)] : no recent weight loss [Recent Wt Gain (___ Lbs)] : no recent weight gain [Difficulty Initiating Sleep] : no difficulty falling asleep [Difficulty Maintaining Sleep] : no difficulty maintaining sleep [Irresistible urge to move legs] : no irresistible urge to move legs because of lower extremity discomfort [LE discomfort relieved by movement] : lower extremity discomfort not relieved by movement [Unusual Sleep Behavior] : no unusual sleep behavior [Cataplexy] :  no cataplexy [Hypnogogic Hallucinations] : no hypnogogic hallucinations [Hypnopompic Hallucinations] : no hypnopompic hallucinations [Sleep Paralysis] : no sleep paralysis [FreeTextEntry1] : 1 AM [FreeTextEntry2] : 7 - 7:30 AM, until 9 am on the days she is off  [FreeTextEntry3] : "very fast" [FreeTextEntry4] : 1-2 x to fix the mask  [FreeTextEntry5] : "quick" [FreeTextEntry6] : 5-6- hours [FreeTextEntry7] : occasional, but tries not to stating she cannot sleep at night if she does.

## 2022-03-24 NOTE — PHYSICAL EXAM
[Normal Appearance] : normal appearance [General Appearance - In No Acute Distress] : no acute distress [] : no respiratory distress [Exaggerated Use Of Accessory Muscles For Inspiration] : no accessory muscle use [Oriented To Time, Place, And Person] : oriented to person, place, and time [Affect] : the affect was normal [Mood] : the mood was normal [Neck Appearance] : the appearance of the neck was normal [Heart Rate And Rhythm] : heart rate was normal and rhythm regular [Heart Sounds] : normal S1 and S2 [Murmurs] : no murmurs [Respiration, Rhythm And Depth] : normal respiratory rhythm and effort [Auscultation Breath Sounds / Voice Sounds] : lungs were clear to auscultation bilaterally [Involuntary Movements] : no involuntary movements were seen [No Focal Deficits] : no focal deficits [Impaired Insight] : insight and judgment were intact [IV] : IV

## 2022-03-24 NOTE — ASSESSMENT
[FreeTextEntry1] : 72 y/o F with overall mild symptomatic NOE, moderate in supine REM position (AHI 8.8/hr; REM AHI 24/hr) presents after receiving a new APAP device to continue with therapy, and for somnolence related to insufficient sleep time. The patient has been doing well since her settings were changed to auto mode, on pressures, 8-20 cmH20 during her last visit in September for residual AHI on straight CPAP mode.\par \par Therapeutic and compliance data download reveals usage 83% of days with an average use of 5 hours and 6 minutes. Therapy based AHI is 4.3/hr on 8-20 cm H2O, with average pressure of 15.5 cmH20. \par Excessive mask leak noted \par \par PLAN:\par # 1. NOE\par ----The patient is experiencing clinical benefit when she utilizes APAP with resolution to apnea-related symptoms, and normalization of AHI from 8.8/hr to 4.3/hr, and will continue with nightly APAP use, as prescribed. \par ----Rx for Tech / RT to assess / repair / replace patient's current APAP device for complaints of water chamber disconnecting intermittently at night, causing a disturbing noise that awakens the patient and her spouse, and an Rx for best-fit FFM for excessive mask leak, were placed to [facility]. \par ----The ramifications of untreated sleep apnea and the importance of continued treatment were discussed with the patient. \par  \par # 2. INSUFFICIENT SLEEP TIME with associated somnolence, drowsy driving, and  ESS score of 11\par ----Dangers of insufficient sleep time and strategies to improve this was discussed with the patient at length. \par ----The patient was asked to go to bed earlier when she feels sleepy, around 11 pm or 12 am to attain a minimum of 7 hours of sleep / night, consistently. \par ----Sleep hygiene measures were reviewed with the patient. \par ----The patient was warned against drowsy driving. \par \par F/ U in 1-year or sooner if needed.

## 2022-05-31 ENCOUNTER — NON-APPOINTMENT (OUTPATIENT)
Age: 72
End: 2022-05-31

## 2022-06-01 ENCOUNTER — APPOINTMENT (OUTPATIENT)
Dept: ORTHOPEDIC SURGERY | Facility: CLINIC | Age: 72
End: 2022-06-01
Payer: MEDICARE

## 2022-06-01 VITALS — HEIGHT: 62 IN | WEIGHT: 157 LBS | BODY MASS INDEX: 28.89 KG/M2

## 2022-06-01 DIAGNOSIS — S92.354A NONDISPLACED FRACTURE OF FIFTH METATARSAL BONE, RIGHT FOOT, INITIAL ENCOUNTER FOR CLOSED FRACTURE: ICD-10-CM

## 2022-06-01 PROCEDURE — 73630 X-RAY EXAM OF FOOT: CPT | Mod: RT

## 2022-06-01 PROCEDURE — 73610 X-RAY EXAM OF ANKLE: CPT | Mod: RT

## 2022-06-01 PROCEDURE — L4361: CPT

## 2022-06-01 PROCEDURE — 28470 CLTX METATARSAL FX WO MNP EA: CPT | Mod: RT

## 2022-06-01 PROCEDURE — 99204 OFFICE O/P NEW MOD 45 MIN: CPT | Mod: 25

## 2022-06-01 NOTE — ASSESSMENT
[FreeTextEntry1] : After their examination today in the office and review of the radiographs they have sustained a base of the fifth metatarsal fracture as well as a lateral midfoot sprain as a result of their foot injury. I did recommend conservative treatment with protection and immobilization in a CAM walker boot which was manipulated and fitted to them today in the office. I would like them to use crutches or a cane to remain partial weight bearing till they are able to transition to full weight bearing in a Cam Walker boot comfortably without any pain. They can come out of the boot throughout the day for range of motion exercises as well as local ice therapy and elevation of the extremity. We discussed at length the poor blood supply to the base of the fifth metatarsal which can lead to delayed union as well as development of a nonunion of this fracture. We discussed that it can take up to 3-5 months for this fracture to fully heal and for the pain to fully resolve. I would like to see them back in 3 weeks for repeat clinical and x-ray examination\par

## 2022-06-01 NOTE — PHYSICAL EXAM
[de-identified] : Constitutional: General Appearance: healthy-appearing, NAD, and normal body habitus.\par General: Well-nourished, well developed female in no apparent distress\par Psych: Clear speech, pleasant mood and affect\par Neurological: Alert and oriented to person, place, and time\par Gait: Antalgc\par Respiratory: Normal respiratory effort\par Skin: No rashes, no lesions, no open skin, no erythema\par \par Inspection: Swelling and ecchymosis over the dorsal and lateral aspect of the midfoot and forefoot.\par \par Palpation: No anterior ankle joint line tenderness. There is no pain over the proximal mid shaft or distal tibia or fibula. The leg compartments and calf are soft and nontender. There is no pain over the ATFL, deltoid ligament, lateral malleolus, medial malleolus, or anterior syndesmosis. No pain over the course of the achilles, peroneal, or posterior tibial tendons. There is no peroneal tendon instability. Downgoing Uribe test. No pain over the calcaneus or over the heel. Non-tender over the sinus tarsi. No pain or instability with passive inversion/eversion. Calf is soft and non-tender\par On examination of the foot: No pain over the talar neck or talar head. No tenderness over the talonavicular joints, TMT or the Lisfranc joints on palpation and stress examination. No tenderness over the navicular, cuboid, cuneiforms, or first, second, and third metatarsals shafts. They are mildly tender over the fourth metatarsal. They are moderately tender over the proximal/base of the fifth metatarsal. There is also tenderness over the calcaneocuboid joint. Full range of motion through the MTP joints. No pain on examination of the toes. Foot compartments are soft.\par \par ROM: 10 degrees of dorsiflexion, 25 degrees of plantar flexion, 15 degrees of inversion and 10 degrees eversion with lateral foot pain\par \par Muscle strength: 4+/5 strength with resisted dorsiflexion, plantar flexion, inversion and 4/5 strength on eversion, with pain on resisted eversion over the lateral midfoot.\par \par Stability: No instability noted with varus/valgus stress. Negative anterior drawer test. Negative syndesmotic squeeze test.\par \par Neurologic Exam : Sensation is grossly intact bilateral upper and lower extremities to light touch throughout. 2+ patellar tendon and Achilles tendon reflexes are noted. There is a downgoing Babinski test. No clonus is noted bilaterally.\par \par Vascular: Arterial Pulses right and Left: posterior tibialis normal and dorsalis pedis normal. Right and Left: no edema, no varicosities and capillary refill test normal.\par \par XRAYS (3v Ankle and foot): Xrays done today in the office were 3 views of the ankle and foot with no limitations to today studies and no comparative studies available for review which reveal mildly displaced fracture of the base of the fifth metatarsal. The ankle mortise appears to be reduced and well maintained. The syndesmosis appears to be reduced. The midfoot and forefoot joints are reduced and well aligned\par

## 2022-06-01 NOTE — HISTORY OF PRESENT ILLNESS
[4] : 4 [2] : 2 [Dull/Aching] : dull/aching [de-identified] : Ms. ESTRADA is a 71 year female who presents today for evaluation of their Right ankle and foot injury. She states that yesterday she was walking she was not looking where she was going and she fell in twisted Her ankle and foot and notice pain swelling and bruising of the outside aspect of her ankle and foot. She has been limping she was seen at an urgent care facility where she was diagnosed with a fractured foot. She presents today for further evaluation of her ankle and foot injury. She denies any recurrent symptoms of ankle or foot sprains [FreeTextEntry3] : 5/31/22 [FreeTextEntry5] : pt was walking when she tripped on the sidewalk. pt has pain in the right foot. pt feels discomfort in the right foot.  pt has a fractured on the right foot.

## 2022-06-02 ENCOUNTER — TRANSCRIPTION ENCOUNTER (OUTPATIENT)
Age: 72
End: 2022-06-02

## 2022-06-21 ENCOUNTER — APPOINTMENT (OUTPATIENT)
Dept: ORTHOPEDIC SURGERY | Facility: CLINIC | Age: 72
End: 2022-06-21
Payer: MEDICARE

## 2022-06-21 PROCEDURE — 73610 X-RAY EXAM OF ANKLE: CPT | Mod: RT

## 2022-06-21 PROCEDURE — 99024 POSTOP FOLLOW-UP VISIT: CPT

## 2022-06-21 PROCEDURE — 73630 X-RAY EXAM OF FOOT: CPT | Mod: RT

## 2022-06-21 NOTE — ASSESSMENT
[FreeTextEntry1] : After their examination today in the office and review of their radiographs, they are doing very well healing and recovering from their fifth metatarsal fracture. There is evidence of early healing. However, I would like them to remain protected in the Cam Walker boot for an additional 3 more weeks till there is further healing of the fifth metatarsal fracture. I would like them to refrain from any gym and sports or strenuous physical activities and to remove the boot throughout the day for range of motion exercises as well as for sleeping and bathing. I would like to see them back in 3-4 weeks for repeat clinical and x-ray examination Patient is a 26 yo single never  man of [place] descent with no dependents, domiciled at home with parents who are his caregivers, unemployed, diagnosed with bipolar disorder, Intermittent Explosive Disorder, mental retardation (IQ=40), multiple inpatient psychiatric admissions last to [facility] in Sept 29 2015 to Oct 7 2015, currently in [facility] with NP Nury Pabon; he has a long history of aggression toward family and chronic poor frustration tolerance; no history of suicide, no legal problems, no substance use, no abuse history, PMH DM2 (poorly controlled) admitted for chest pain r/o MI.     Patient seen with CO 1:1 at bedside. Pt perseverative insisting that he wants to cut off his name band. Pt does appear to be disorganized however not aggressive.  He also appears to be somewhat sleepy; likely 2/2 PRNs received earlier. Pt reports that he came to the hospital for "coffee".  Per primary team, pt agitated, pulling at lines. Pt received thus far benadryl 25 mg IVP/Ativan 2 mg @ 13:00. Zyprexa 5 mg @ 15:00.     Would recommend Ativan 2 mg Q6H PRN; pt does not appear to be agitated however disorganized which likely 2/2 intellectual disability.  If pt continues to be agitated despite ativan PRNs, can give haldol 5 mg IVP Q6H (max 2 pushes within 24 hrs). Max daily haldol dose 30 mg (PO/IM/IV). Check EKG; hold antipsychotics if QTC>500ms.     Continue home medications unless if medically contraindicated: Propranolol 40mg bid, Divalproex 500mg bid, Haloperidol 20mg qd, Benztropine 1mg bid.    Psychiatry to follow. Patient is a 28 yo single never  man of [place] descent with no dependents, domiciled at home with parents who are his caregivers, unemployed, diagnosed with bipolar disorder, Intermittent Explosive Disorder, mental retardation (IQ=40), multiple inpatient psychiatric admissions last to [facility] in Sept 29 2015 to Oct 7 2015, currently in [facility] with NP Nury Pabon; he has a long history of aggression toward family and chronic poor frustration tolerance; no history of suicide, no legal problems, no substance use, no abuse history, PMH DM2 (poorly controlled) admitted for chest pain r/o MI.     Patient seen with CO 1:1 at bedside. Pt perseverative insisting that he wants to cut off his name band. Pt does appear to be disorganized however not aggressive.  He also appears to be somewhat sleepy; likely 2/2 PRNs received earlier. Pt reports that he came to the hospital for "coffee".  Per primary team, pt agitated, pulling at lines. Pt received thus far benadryl 25 mg IVP/Ativan 2 mg @ 13:00. Zyprexa 5 mg @ 15:00.     Would recommend Ativan 2 mg Q6H PRN for agitation; at this time, pt does not appear to be agitated however disorganized which likely 2/2 intellectual disability.  If pt continues to be agitated despite ativan PRNs, can give haldol 5 mg IVP Q6H (max 2 pushes within 24 hrs). Max daily haldol dose 30 mg (PO/IM/IV). Check EKG; hold antipsychotics if QTC>500ms, follow closely for EPS    Continue home medications unless if medically contraindicated: Propranolol 40mg bid, Divalproex 500mg bid, Haloperidol 20mg qd, Benztropine 1mg bid.    Psychiatry to follow.

## 2022-06-21 NOTE — PHYSICAL EXAM
[de-identified] : Constitutional: General Appearance: healthy-appearing, NAD, and normal body habitus.\par General: Well-nourished, well developed female in no apparent distress\par Psych: Clear speech, pleasant mood and affect\par Neurological: Alert and oriented to person, place, and time\par Gait: Antalgc\par Respiratory: Normal respiratory effort\par Skin: No rashes, no lesions, no open skin, no erythema\par \par Inspection: Swelling and ecchymosis over the dorsal and lateral aspect of the midfoot and forefoot.\par \par Palpation: No anterior ankle joint line tenderness. There is no pain over the proximal mid shaft or distal tibia or fibula. The leg compartments and calf are soft and nontender. There is no pain over the ATFL, deltoid ligament, lateral malleolus, medial malleolus, or anterior syndesmosis. No pain over the course of the achilles, peroneal, or posterior tibial tendons. There is no peroneal tendon instability. Downgoing Uribe test. No pain over the calcaneus or over the heel. Non-tender over the sinus tarsi. No pain or instability with passive inversion/eversion. Calf is soft and non-tender\par On examination of the foot: No pain over the talar neck or talar head. No tenderness over the talonavicular joints, TMT or the Lisfranc joints on palpation and stress examination. No tenderness over the navicular, cuboid, cuneiforms, or first, second, and third metatarsals shafts. They are mildly tender over the fourth metatarsal. They are moderately tender over the proximal/base of the fifth metatarsal. There is also tenderness over the calcaneocuboid joint. Full range of motion through the MTP joints. No pain on examination of the toes. Foot compartments are soft.\par \par ROM: 10 degrees of dorsiflexion, 25 degrees of plantar flexion, 15 degrees of inversion and 10 degrees eversion with lateral foot pain\par \par Muscle strength: 4+/5 strength with resisted dorsiflexion, plantar flexion, inversion and 4/5 strength on eversion, with pain on resisted eversion over the lateral midfoot.\par \par Stability: No instability noted with varus/valgus stress. Negative anterior drawer test. Negative syndesmotic squeeze test.\par \par Neurologic Exam : Sensation is grossly intact bilateral upper and lower extremities to light touch throughout. 2+ patellar tendon and Achilles tendon reflexes are noted. There is a downgoing Babinski test. No clonus is noted bilaterally.\par \par Vascular: Arterial Pulses right and Left: posterior tibialis normal and dorsalis pedis normal. Right and Left: no edema, no varicosities and capillary refill test normal.\par \par XRAYS (3v Ankle and foot): Xrays done today in the office were 3 views of the ankle and foot with no limitations to today studies and no comparative studies available for review which reveal mildly displaced fracture of the base of the fifth metatarsal. The ankle mortise appears to be reduced and well maintained. The syndesmosis appears to be reduced. The midfoot and forefoot joints are reduced and well aligned\par

## 2022-06-21 NOTE — HISTORY OF PRESENT ILLNESS
[1] : 2 [de-identified] : Is here for 3-week follow-up of her foot injury and fifth metatarsal fracture.  She is currently doing very well she has decided to walk in the postsurgical shoe rather than the cam walker boot.  She has minimal pain at this time.  She denies any calf pain or any shortness of breath or any numbness or tingling of the leg ankle foot or toes [FreeTextEntry1] : RT Foot  [FreeTextEntry5] : Blossom is here today to F/U on her RT Foot.\par Foot is doing well and she is walking well with the help of the crutches and the small shoe she is wearing.\par No swelling just black and blue.\par No real pain unless she does certain movements.

## 2022-07-20 ENCOUNTER — APPOINTMENT (OUTPATIENT)
Dept: ORTHOPEDIC SURGERY | Facility: CLINIC | Age: 72
End: 2022-07-20

## 2022-07-20 VITALS — WEIGHT: 157 LBS | HEIGHT: 62 IN | BODY MASS INDEX: 28.89 KG/M2

## 2022-07-20 PROCEDURE — 73630 X-RAY EXAM OF FOOT: CPT | Mod: RT

## 2022-07-20 PROCEDURE — 99024 POSTOP FOLLOW-UP VISIT: CPT

## 2022-07-20 NOTE — HISTORY OF PRESENT ILLNESS
[4] : 4 [2] : 2 [Dull/Aching] : dull/aching [Intermittent] : intermittent [Rest] : rest [Walking] : walking [de-identified] : Is here for follow-up of her fifth metatarsal fracture.  She has been doing very well she has minimal pain around the foot and ankle.\par She has been protected in a cam walker boot.  She states that she has minimal to no pain she denies any calf pain or any shortness of breath or any fever or chills.  She feels she is doing very well.\par  [de-identified] : motion

## 2022-07-20 NOTE — PHYSICAL EXAM
[de-identified] : \par \par General: Well-nourished, well developed female in no apparent distress\par Psych: Clear speech, pleasant mood and affect\par Neurological: Alert and oriented to person, place, and time\par Gait: Normal in Cam walker\par Respiratory: Normal respiratory effort\par Skin: No rashes, no lesions, no open skin, no ecchymosis, no erythema\par \par Examination of the ankle and foot: Full range of motion of the ankle and hindfoot with 4+/5 strength in all muscle groups, except for 4+/5 strength on resisted eversion. . The calf is soft and nontender. There is mild residual swelling over the lateral border of the foot at the level of the base of the fifth metatarsal. There is no pain in the anterior ankle joint line or the anterior syndesmosis. No pain over the medial and lateral malleoli or over the course of the Achilles, peroneal and posterior tibial tendons. There is no instability or laxity on examination of the ankle hindfoot. There is no pain over the sinus tarsi, transverse tarsal joints, TMT joints or the Lisfranc joint. There is no pain over the talar neck and the talar head. No pain of the navicular, cuboid or cuneiforms. There is mild pain over the base of the fifth metatarsal and no pain over the fifth metatarsal shaft. There is no pain over the first, second, third or fourth metatarsals. No pain over the MTP joints or on examination of the toes. The toes remained reduced, well aligned and well maintained with full painless range of motion. Sensation is grossly intact throughout to light touch with 2+ DP/PT pulses.\par X-rays done today in the office were 3 views of the foot with no limitations to today's studies which revealed a healing minimally displaced fracture of the base of the fifth metatarsal. No interval displacement is noted. The overall alignment is well maintained. No other acute fractures or dislocations are noted.

## 2022-07-20 NOTE — ASSESSMENT
[FreeTextEntry1] : After their examination today in the office and review of the radiographs, I do think they are doing very well, healing and recovering from their base of the fifth metatarsal fracture as their pain has improved significantly walking in a Cam Walker boot. At this time I do think there is adequate healing and improvement in their pain, so I would recommend transitioning them out of the Cam Walker boot into a good stiff soled, comfortable shoe and recommended placing a carbon fiber plate into all of their shoes in order to create a stiff sole of the shoe to continue to splint and protect the healing base of the fifth metatarsal fracture. They can begin low impact activities such as walking or stationary bike  with the carbon fiber plate in place as long as there is no pain doing so. We discussed that it can take 2 or 3 more months for this fracture to completely heal. I will see them back in 6 weeks for repeat clinical and weight bearing radiographic examination of the foot

## 2022-08-16 ENCOUNTER — NON-APPOINTMENT (OUTPATIENT)
Age: 72
End: 2022-08-16

## 2022-08-16 ENCOUNTER — APPOINTMENT (OUTPATIENT)
Dept: INTERNAL MEDICINE | Facility: CLINIC | Age: 72
End: 2022-08-16

## 2022-08-16 VITALS
BODY MASS INDEX: 29.63 KG/M2 | HEIGHT: 62 IN | TEMPERATURE: 97.5 F | WEIGHT: 161 LBS | OXYGEN SATURATION: 99 % | HEART RATE: 87 BPM

## 2022-08-16 DIAGNOSIS — R82.90 UNSPECIFIED ABNORMAL FINDINGS IN URINE: ICD-10-CM

## 2022-08-16 PROCEDURE — G0439: CPT

## 2022-08-16 PROCEDURE — G0444 DEPRESSION SCREEN ANNUAL: CPT

## 2022-08-16 PROCEDURE — 36415 COLL VENOUS BLD VENIPUNCTURE: CPT

## 2022-08-16 PROCEDURE — 93000 ELECTROCARDIOGRAM COMPLETE: CPT | Mod: 59

## 2022-08-17 ENCOUNTER — APPOINTMENT (OUTPATIENT)
Dept: ORTHOPEDIC SURGERY | Facility: CLINIC | Age: 72
End: 2022-08-17

## 2022-08-17 VITALS — SYSTOLIC BLOOD PRESSURE: 110 MMHG | DIASTOLIC BLOOD PRESSURE: 68 MMHG

## 2022-08-17 VITALS — WEIGHT: 161 LBS | BODY MASS INDEX: 29.63 KG/M2 | HEIGHT: 62 IN

## 2022-08-17 PROCEDURE — 73630 X-RAY EXAM OF FOOT: CPT | Mod: RT

## 2022-08-17 PROCEDURE — 99024 POSTOP FOLLOW-UP VISIT: CPT

## 2022-08-17 NOTE — HISTORY OF PRESENT ILLNESS
[FreeTextEntry1] : The patient is here for a routine visit.  [de-identified] : She is recovering from a foot fracture- she had tripped on a curb.\par \par She does some walking and PT. She o/w doesn't exercise.  Her diet is ok.  \par \par She uses CPAP for the NOE.

## 2022-08-17 NOTE — ASSESSMENT
[FreeTextEntry1] : Discussed diet and exercise and she was urged to try to lose weight.  Check lipids.   The BP is fine. \par \par She has had the COVID-19 vaccine and two boosters.\par \par She declines other vaccines.\par \par Colonoscopy 5/18 and she says she had another in 2021.  She will send the report.\par \par DEXA 7/20 and another advised.  She says she previously didn't tolerate Prolia.\par \par Gyn advised (Dr. So.)  Mammogram and breast U/S due and advised.  \par \par She sees a dermatologist and ophthalmologist.  \par \par She has no urinary symptoms now.  However, the MOA noted cloudy and foul-smelling urine and a UCX will be sent out.

## 2022-08-17 NOTE — ASSESSMENT
[FreeTextEntry1] : After her examination today in the office and review of her radiographs she is doing well slowly and gradually healing her base of the fifth metatarsal fracture.  I would like her to keep the carbon fiber plate in her sneakers on a daily basis which we will continue to splint and protect him to shield her healing base of the fifth metatarsal fracture she can continue to walk with the plate in her sneaker and shoe as she is able to do so without any pain and discomfort.  I would like to see her back in 6 weeks for repeat clinical and x-ray examination or I will see her back earlier if she does notice any increased pain or has any concerns

## 2022-08-17 NOTE — HISTORY OF PRESENT ILLNESS
[3] : 3 [2] : 2 [Dull/Aching] : dull/aching [Intermittent] : intermittent [Rest] : rest [Physical therapy] : physical therapy [Walking] : walking [de-identified] : Is here for follow-up of her right base of the fifth metatarsal fracture.  She is currently doing very well she is walking in a stiff soled shoe and sneaker without any pain day today.  She does walk short distances around her home comfortably.  She denies any calf pain or any shortness of breath and notices that her foot swelling has resolved.

## 2022-08-17 NOTE — PHYSICAL EXAM
[de-identified] : \par \par General: Well-nourished, well developed female in no apparent distress\par Psych: Clear speech, pleasant mood and affect\par Neurological: Alert and oriented to person, place, and time\par Gait: Normal in Cam walker\par Respiratory: Normal respiratory effort\par Skin: No rashes, no lesions, no open skin, no ecchymosis, no erythema\par \par Examination of the ankle and foot: Full range of motion of the ankle and hindfoot with 4+/5 strength in all muscle groups, except for 4+/5 strength on resisted eversion. . The calf is soft and nontender. There is mild residual swelling over the lateral border of the foot at the level of the base of the fifth metatarsal. There is no pain in the anterior ankle joint line or the anterior syndesmosis. No pain over the medial and lateral malleoli or over the course of the Achilles, peroneal and posterior tibial tendons. There is no instability or laxity on examination of the ankle hindfoot. There is no pain over the sinus tarsi, transverse tarsal joints, TMT joints or the Lisfranc joint. There is no pain over the talar neck and the talar head. No pain of the navicular, cuboid or cuneiforms. There is mild pain over the base of the fifth metatarsal and no pain over the fifth metatarsal shaft. There is no pain over the first, second, third or fourth metatarsals. No pain over the MTP joints or on examination of the toes. The toes remained reduced, well aligned and well maintained with full painless range of motion. Sensation is grossly intact throughout to light touch with 2+ DP/PT pulses.\par X-rays done today in the office were 3 views of the foot with no limitations to today's studies which revealed a healing minimally displaced fracture of the base of the fifth metatarsal. No interval displacement is noted. The overall alignment is well maintained. No other acute fractures or dislocations are noted.

## 2022-08-17 NOTE — HEALTH RISK ASSESSMENT
[Never] : Never [No] : No [No falls in past year] : Patient reported no falls in the past year [0] : 2) Feeling down, depressed, or hopeless: Not at all (0) [Fully functional (bathing, dressing, toileting, transferring, walking, feeding)] : Fully functional (bathing, dressing, toileting, transferring, walking, feeding) [Fully functional (using the telephone, shopping, preparing meals, housekeeping, doing laundry, using] : Fully functional and needs no help or supervision to perform IADLs (using the telephone, shopping, preparing meals, housekeeping, doing laundry, using transportation, managing medications and managing finances) [[id number]] : 0 [Reports changes in hearing] : Reports no changes in hearing [Reports changes in vision] : Reports no changes in vision [Reports changes in dental health] : Reports no changes in dental health

## 2022-08-19 ENCOUNTER — NON-APPOINTMENT (OUTPATIENT)
Age: 72
End: 2022-08-19

## 2022-08-20 ENCOUNTER — EMERGENCY (EMERGENCY)
Facility: HOSPITAL | Age: 72
LOS: 1 days | Discharge: HOME | End: 2022-08-20
Attending: EMERGENCY MEDICINE | Admitting: EMERGENCY MEDICINE
Payer: MEDICARE

## 2022-08-20 ENCOUNTER — NON-APPOINTMENT (OUTPATIENT)
Age: 72
End: 2022-08-20

## 2022-08-20 VITALS
HEART RATE: 66 BPM | TEMPERATURE: 98 F | WEIGHT: 160.06 LBS | SYSTOLIC BLOOD PRESSURE: 131 MMHG | RESPIRATION RATE: 16 BRPM | HEIGHT: 62 IN | OXYGEN SATURATION: 99 % | DIASTOLIC BLOOD PRESSURE: 66 MMHG

## 2022-08-20 VITALS
RESPIRATION RATE: 20 BRPM | DIASTOLIC BLOOD PRESSURE: 67 MMHG | HEART RATE: 65 BPM | SYSTOLIC BLOOD PRESSURE: 125 MMHG | OXYGEN SATURATION: 97 % | TEMPERATURE: 98 F

## 2022-08-20 LAB
25(OH)D3 SERPL-MCNC: 47.8 NG/ML
ALBUMIN SERPL ELPH-MCNC: 4.4 G/DL
ALP BLD-CCNC: 66 U/L
ALT SERPL-CCNC: 16 U/L
ANION GAP SERPL CALC-SCNC: 12 MMOL/L
APPEARANCE: ABNORMAL
AST SERPL-CCNC: 21 U/L
BACTERIA UR CULT: NORMAL
BACTERIA: ABNORMAL
BASOPHILS # BLD AUTO: 0.03 K/UL
BASOPHILS NFR BLD AUTO: 0.7 %
BILIRUB SERPL-MCNC: 0.4 MG/DL
BILIRUBIN URINE: NEGATIVE
BLOOD URINE: NEGATIVE
BUN SERPL-MCNC: 20 MG/DL
CALCIUM SERPL-MCNC: 9.6 MG/DL
CHLORIDE SERPL-SCNC: 104 MMOL/L
CHOLEST SERPL-MCNC: 221 MG/DL
CO2 SERPL-SCNC: 26 MMOL/L
COLOR: YELLOW
CREAT SERPL-MCNC: 0.79 MG/DL
EGFR: 80 ML/MIN/1.73M2
EOSINOPHIL # BLD AUTO: 0.09 K/UL
EOSINOPHIL NFR BLD AUTO: 2 %
ESTIMATED AVERAGE GLUCOSE: 117 MG/DL
GLUCOSE QUALITATIVE U: NEGATIVE
GLUCOSE SERPL-MCNC: 84 MG/DL
HBA1C MFR BLD HPLC: 5.7 %
HCT VFR BLD CALC: 37.8 %
HDLC SERPL-MCNC: 50 MG/DL
HGB BLD-MCNC: 12 G/DL
HYALINE CASTS: 0 /LPF
IMM GRANULOCYTES NFR BLD AUTO: 0.2 %
KETONES URINE: NEGATIVE
LDLC SERPL CALC-MCNC: 148 MG/DL
LEUKOCYTE ESTERASE URINE: ABNORMAL
LYMPHOCYTES # BLD AUTO: 1.54 K/UL
LYMPHOCYTES NFR BLD AUTO: 33.7 %
MAN DIFF?: NORMAL
MCHC RBC-ENTMCNC: 29.3 PG
MCHC RBC-ENTMCNC: 31.7 GM/DL
MCV RBC AUTO: 92.4 FL
MICROSCOPIC-UA: NORMAL
MONOCYTES # BLD AUTO: 0.42 K/UL
MONOCYTES NFR BLD AUTO: 9.2 %
NEUTROPHILS # BLD AUTO: 2.48 K/UL
NEUTROPHILS NFR BLD AUTO: 54.2 %
NITRITE URINE: NEGATIVE
NONHDLC SERPL-MCNC: 171 MG/DL
PH URINE: 6
PLATELET # BLD AUTO: 266 K/UL
POTASSIUM SERPL-SCNC: 4.3 MMOL/L
PROT SERPL-MCNC: 7.3 G/DL
PROTEIN URINE: NORMAL
RBC # BLD: 4.09 M/UL
RBC # FLD: 13 %
RED BLOOD CELLS URINE: 0 /HPF
SODIUM SERPL-SCNC: 141 MMOL/L
SPECIFIC GRAVITY URINE: 1.02
SQUAMOUS EPITHELIAL CELLS: 1 /HPF
T4 FREE SERPL-MCNC: 1 NG/DL
TRIGL SERPL-MCNC: 116 MG/DL
TSH SERPL-ACNC: 2.1 UIU/ML
UROBILINOGEN URINE: NORMAL
WBC # FLD AUTO: 4.57 K/UL
WHITE BLOOD CELLS URINE: 25 /HPF

## 2022-08-20 PROCEDURE — 99284 EMERGENCY DEPT VISIT MOD MDM: CPT | Mod: 25

## 2022-08-20 PROCEDURE — 99284 EMERGENCY DEPT VISIT MOD MDM: CPT | Mod: FS

## 2022-08-20 PROCEDURE — 70486 CT MAXILLOFACIAL W/O DYE: CPT | Mod: MA

## 2022-08-20 PROCEDURE — 70486 CT MAXILLOFACIAL W/O DYE: CPT | Mod: 26,MA

## 2022-08-20 NOTE — ED PROVIDER NOTE - NS_ ATTENDINGSCRIBEDETAILS _ED_A_ED_FT
Arian Eugene MD - The scribe's documentation has been prepared under my direction and personally reviewed by me in its entirety. I confirm that the note above accurately reflects all work, treatment, procedures, and medical decision making performed by me.

## 2022-08-20 NOTE — ED PROVIDER NOTE - SKIN, MLM
Skin normal color for race, warm, dry and intact. No evidence of rash. +ecchymosis noted to left periorbital region

## 2022-08-20 NOTE — ED PROVIDER NOTE - CLINICAL SUMMARY MEDICAL DECISION MAKING FREE TEXT BOX
70 y/o female with PMHx of HLD, osteoporosis presents with c/o left periorbital swelling/pain/bruising s/p fall yesterday. Pt states that she was bending down to  cucumbers from her garden yesterday when her leg gave out and fell hitting her face. Pt states that she had eye glasses on but it did not break. Pt was seen at [facility] urgent care today and sent to ED for ct scan. States that her vision was intact in urgent care. Denies LOC. On exam, perrl eomi, ecchymosis with swelling noted to left periorbital region with tenderness, swelling and ecchymosis, no crepitus. CT maxillofacial negative for fx. Scribe for Attendin70 y/o female with PMHx of HLD, osteoporosis presents with c/o left periorbital swelling/pain/bruising s/p fall yesterday. Pt states that she was bending down to  cucumbers from her garden yesterday when her leg gave out and fell hitting her face. Pt states that she had eye glasses on but it did not break. Pt was seen at [facility] urgent care today and sent to ED for ct scan. States that her vision was intact in urgent care. Denies LOC. On exam, perrl eomi, ecchymosis with swelling noted to left periorbital region with tenderness, swelling and ecchymosis, no crepitus. CT maxillofacial negative for fx.

## 2022-08-20 NOTE — ED PROVIDER NOTE - PROGRESS NOTE DETAILS
Reevaluated patient at bedside.  Discussed the results of all diagnostic testing in ED and copies of all reports given.  Advised to ice, tylenol, f/u pcp. An opportunity to ask questions was given.  Discussed the importance of prompt, close medical follow-up.  Patient will return with any changes, concerns or persistent / worsening symptoms.  Understanding of all instructions verbalized.

## 2022-08-20 NOTE — ED PROVIDER NOTE - OBJECTIVE STATEMENT
70 y/o F with hx of HLD, osteoporosis presents with c/o left periorbital swelling/pain/bruising s/p fall yesterday. Pt states that she was bending down to  cucumbers from her garden yesterday when her leg gave out and fell hitting her face. Pt states that she had eye glasses on but it did not break. Pt was seen at [facility] urgent care today and sent to ED for ct scan. States that her vision was intact in urgent care. Denies LOC, use of blood thinners, headache, dizziness, n/v, numbness, tingling, weakness, neck/back/hip pain, cp, sob, abdominal pain, vision changes, pain inside eye or other symptoms/injuries.

## 2022-08-20 NOTE — ED PROVIDER NOTE - NSFOLLOWUPINSTRUCTIONS_ED_ALL_ED_FT
Follow up with your PMD for re-evaluation, ongoing care and treatment. Rest, ice compresses to swelling for 20 minutes every 3-4 hours the first 24 hours. Take tylenol as directed for pain. If having worsening of symptoms or other related symptoms, RETURN TO THE ER IMMEDIATELY.     Facial Contusion    WHAT YOU NEED TO KNOW:    A facial contusion is a bruise that appears on your face after an injury. A bruise happens when small blood vessels tear but skin does not. When blood vessels tear, blood leaks into nearby tissue, such as soft tissue or muscle. You may develop swelling and bruising around your eyes if your bruise is on your brow, forehead, or the bridge of your nose.     DISCHARGE INSTRUCTIONS:    Return to the emergency department if:   •You have a fever.      •You have watery, clear fluid draining from your nose.       •You have changes in your vision or eye appearance.      •You have changes or pain with eye movement.      •You have tingling or numbness in or near the injured area.      Contact your healthcare provider if:   •You find a new lump in the injured area.      •Your symptoms do not improve with treatment.      •You have questions or concerns about your condition or care.      Medicines:   •Acetaminophen decreases pain. It is available without a doctor's order. Ask how much to take and how often to take it. Follow directions. Acetaminophen can cause liver damage if not taken correctly.      •Take your medicine as directed. Contact your healthcare provider if you think your medicine is not helping or if you have side effects. Tell him or her if you are allergic to any medicine. Keep a list of the medicines, vitamins, and herbs you take. Include the amounts, and when and why you take them. Bring the list or the pill bottles to follow-up visits. Carry your medicine list with you in case of an emergency.      Ice: Apply ice on your bruise for 15 to 20 minutes every hour or as directed. Use an ice pack, or put crushed ice in a plastic bag. Cover it with a towel. Ice helps prevent tissue damage and decreases swelling and pain.    Elevation: Sleep with your head elevated to help decrease swelling.     Help your contusion heal: Do not massage the area or put heating pads or other warming devices on the bruise right after your injury. Heat and massage may slow the healing of the area.    Follow up with your healthcare provider as directed: You may need to return within a week to have your injury checked again. Write down any questions you have so you remember to ask them in your follow-up visits.    Prevent a facial contusion:   •Use safety belts and child restraints.       •Use safety helmets when you ride a bicycle or motorcycle.       •Use a mouth and face guard during sports.

## 2022-08-20 NOTE — ED ADULT NURSE NOTE - OBJECTIVE STATEMENT
72yo female walked into ED, pt c/o bilat face and head pain secondary to "falling over and hitting my head". pt advised staff she was picking up cucumbers, fell over and hit her head. pt denies neck, back pain or LOC.

## 2022-08-20 NOTE — ED PROVIDER NOTE - ENMT, MLM
Airway patent, Nasal mucosa clear. Mouth with normal mucosa. Throat has no vesicles, no oropharyngeal exudates and uvula is midline. +ecchymosis with swelling noted to L periorbital region with ttp to infraorbital region, nasal bones and jaw B NT, able to open and close mouth without difficulty

## 2022-08-20 NOTE — ED PROVIDER NOTE - MUSCULOSKELETAL, MLM
Spine appears normal, range of motion is not limited, no muscle or joint tenderness, no C/T/L spine ttp with FROM, BUE and BLE NT with FROM

## 2022-08-20 NOTE — ED PROVIDER NOTE - PATIENT PORTAL LINK FT
You can access the FollowMyHealth Patient Portal offered by [facility] by registering at the following website: [email or web address]. By joining [employer]’s FollowMyHealth portal, you will also be able to view your health information using other applications (apps) compatible with our system.

## 2022-08-22 ENCOUNTER — TRANSCRIPTION ENCOUNTER (OUTPATIENT)
Age: 72
End: 2022-08-22

## 2022-09-09 ENCOUNTER — APPOINTMENT (OUTPATIENT)
Dept: INTERNAL MEDICINE | Facility: CLINIC | Age: 72
End: 2022-09-09

## 2022-09-09 VITALS
OXYGEN SATURATION: 97 % | HEIGHT: 62 IN | WEIGHT: 156 LBS | BODY MASS INDEX: 28.71 KG/M2 | TEMPERATURE: 97.3 F | HEART RATE: 82 BPM

## 2022-09-09 PROCEDURE — 99213 OFFICE O/P EST LOW 20 MIN: CPT | Mod: 25

## 2022-09-10 NOTE — ASSESSMENT
[FreeTextEntry1] : She had a left facial injury which is healing.  There is a slight residual ecchymosis and the lump is likely normal healing process.  The eye seems unaffected.  She can use Tylenol PRN.  Call PRN.\par \par She asks about mold which she says is in her workplace.  \par \par WE discussed the lipids which are fair.  Diet discussed and advised.  She should try to exercise although that might be difficult given her orthopedic issues.  Start Atorvastatin 10 mg and she agrees- she meets criteria.  Check in three months.

## 2022-09-10 NOTE — HISTORY OF PRESENT ILLNESS
[de-identified] : The patient comes in to have her cheek checked.  She had fallen in the yard and had a left facial trauma.  She was evaluated in the ER and discharged.  The left cheek feels better but there is still soreness and a lump there.  She has full motion of the face and orbits.  \par \par

## 2022-10-07 ENCOUNTER — APPOINTMENT (OUTPATIENT)
Dept: ORTHOPEDIC SURGERY | Facility: CLINIC | Age: 72
End: 2022-10-07

## 2022-10-07 PROCEDURE — 99213 OFFICE O/P EST LOW 20 MIN: CPT

## 2022-10-07 PROCEDURE — 73610 X-RAY EXAM OF ANKLE: CPT | Mod: RT

## 2022-10-07 PROCEDURE — 73630 X-RAY EXAM OF FOOT: CPT | Mod: RT

## 2022-10-07 NOTE — ASSESSMENT
[FreeTextEntry1] : After her examination today in the office and review of her radiographs I would like her to continue to walk in a good stiff soled supportive shoe or sneaker on a daily basis she can continue with walking and low impact activities as I do think she is healing slowly and gradually and she feels she is doing better and she feels she is healing and recovering well.  She can continue with her low impact activity such as walking which she enjoys in her stiff soled shoes and I would like to see her back in 6 weeks for repeat clinical and x-ray examination

## 2022-10-07 NOTE — HISTORY OF PRESENT ILLNESS
[de-identified] : Is here for follow-up of her right foot injury and base of the fifth metatarsal fracture.  She has been walking in a stiff soled shoes.  She feels minimal pain around the ankle or foot she does not notice any swelling.  She does feel that she is doing very well.  She feels very comfortable with minimal pain with walking in a good supportive stiff soled shoe.  She does not notice any swelling redness or warmth of the foot. [FreeTextEntry1] : RT Foot  [FreeTextEntry5] : Blossom is here for follow up Rt Foot, she has been improving and still has intermittent pain, she is currently still breaking in her new shoes and she is using the carbon fiber plate to help alleviate her pain

## 2022-10-07 NOTE — IMAGING
[de-identified] : General: Well-nourished,  in no apparent distress\par Psych: Clear speech, pleasant mood and affect\par Neurological: Alert and oriented to person, place, and time\par Gait: Normal\par Respiratory: Normal respiratory effort\par Skin: No rashes, no lesions, no open skin, no ecchymosis, no erythema\par \par On examination of the ankle and foot there is no swelling or ecchymosis or erythema noted. Hindfoot alignment is in slight valgus. There is no pain over the proximal mid shaft or distal tibia or fibula. Negative syndesmotic squeeze test. There is no pain over the ankle joint, subtalar joint, transverse tarsal joints, or TMT joints. No pain over the medial or lateral malleolus. No pain over the proximal or mid shaft tibia or fibula. The leg compartments including the calf are soft and non-tender with a down-going Uribe test. There is no pain over the ATFL or CFL laterally or deltoid ligament medially. \par There is no pain  over the course of the Achilles, peroneal or posterior tibial tendons. No ankle or hindfoot instability is noted. \par No pain over the calcaneus, talar neck or talar head. No pain over the navicular, cuboid, cuneiforms, metatarsal shafts or on examination of the toes.  She is minimally tender over the base of the fifth metatarsal.  No web-space tenderness. No pain over the metatarsal heads or MTP joints. There is full flexion and extension of all the toes. The hallux and lesser toes are reduced and well aligned. \par Sensation is grossly intact throughout to light touch, there is 2+ Achilles tendon reflexes. There is 2+ DP/PT pulses, with brisk capillary refill in all of the toes.\par There is good range of motion of the ankle and hindfoot with 5/5 strength throughout all muscle groups.\par \par X-rays done today in the office 3 views of the ankle and foot which reveals interval healing and consolidation of her base of the fifth metatarsal fracture which remains minimally displaced.  No acute fractures or dislocations. The ankle mortise and syndesmosis are reduced and well aligned. The midfoot and forefoot joints are reduced and well aligned\par

## 2022-12-09 ENCOUNTER — APPOINTMENT (OUTPATIENT)
Dept: ORTHOPEDIC SURGERY | Facility: CLINIC | Age: 72
End: 2022-12-09

## 2022-12-09 DIAGNOSIS — M79.671 PAIN IN RIGHT FOOT: ICD-10-CM

## 2022-12-09 DIAGNOSIS — S92.354D NONDISPLACED FRACTURE OF FIFTH METATARSAL BONE, RIGHT FOOT, SUBSEQUENT ENCOUNTER FOR FRACTURE WITH ROUTINE HEALING: ICD-10-CM

## 2022-12-09 PROCEDURE — 73630 X-RAY EXAM OF FOOT: CPT | Mod: RT

## 2022-12-09 PROCEDURE — 99213 OFFICE O/P EST LOW 20 MIN: CPT

## 2022-12-09 NOTE — ASSESSMENT
[FreeTextEntry1] : After her examination today in the office and review of her radiographs I do think she is doing very well healing and recovering from her fifth metatarsal fracture treated conservatively.  She is now a few months out from her injury and appears to be healing well we discussed the importance of good supportive shoe wear and shoes that are more stiff and more supportive of the midfoot and forefoot.  I gave her good recommendations of a new balance stiff soled walking sneaker that she can walk and do any type of low impact exercises with.  She feels that she is doing very well and she has no complaints or limitations at this time.  I discussed with her that since I am leaving this group that if she has any pain or any discomfort or she has any concerns she can follow-up with one of the 3 other orthopedic foot and ankle specialists which I have given her the name and number to arrange for a follow-up appointment or she can follow-up with me at my new practice if she would like.

## 2022-12-09 NOTE — IMAGING
[de-identified] : General: Well-nourished,  in no apparent distress\par Psych: Clear speech, pleasant mood and affect\par Neurological: Alert and oriented to person, place, and time\par Gait: Normal\par Respiratory: Normal respiratory effort\par Skin: No rashes, no lesions, no open skin, no ecchymosis, no erythema\par \par On examination of the ankle and foot: There is no swelling or ecchymosis or erythema noted. Hindfoot alignment is in slight valgus. There is no pain over the proximal mid shaft or distal tibia or fibula. Negative syndesmotic squeeze test. There is no pain over the ankle joint, subtalar joint, transverse tarsal joints, or TMT joints. No pain over the medial or lateral malleolus.   No pain over the proximal or mid shaft tibia or fibula. The leg compartments including the calf are soft and non-tender with a down-going Uribe test. There is no pain over the ATFL or CFL laterally or deltoid ligament medially. \par There is no pain  over the course of the Achilles, peroneal or posterior tibial tendons.  Peroneal tendons are stable.  No ankle or hindfoot instability is noted. \par No instability or laxity is noted on varus valgus stress and anterior drawer testing\par No pain over the calcaneus, talar neck or talar head. No pain over the navicular, cuboid, cuneiforms, metatarsal shafts or on examination of the toes. No web-space tenderness. No pain over the metatarsal heads or MTP joints.  There is minimal tenderness over the proximal or base of the fifth metatarsal.  There is full flexion and extension of all the toes. The hallux and lesser toes are reduced and well aligned. \par Sensation is grossly intact throughout to light touch, there is 2+ Achilles tendon reflexes. There is 2+ DP/PT pulses, with brisk capillary refill in all of the toes.\par There is good range of motion of the ankle and hindfoot with 5/5 strength throughout all muscle groups.\par \par X-rays done today in the office 3 views of the foot which reveals further healing and consolidation of a mildly to minimally displaced fracture of the base of the fifth metatarsal which appears to be healing well in good positioning.  The ankle mortise and syndesmosis are reduced and well aligned. The midfoot and forefoot joints are reduced and well aligned

## 2022-12-09 NOTE — HISTORY OF PRESENT ILLNESS
[de-identified] : Is here for follow-up of her right foot injury and fifth metatarsal fracture.  She is currently doing very well.  She has transition to walking in a regular shoe.  She has just some minimal which she describes as twinges on her foot however overall she is doing very well walking without any pain or any discomfort or any swelling of the foot or toes she is able to move her toes well.  She denies any catching or locking of the ankle or foot and she denies any calf pain or any shortness of breath [FreeTextEntry2] : RT Foot  [FreeTextEntry5] : ARMIDA 72 year old F here for RT foot, slight pain when not in shoes, overall has been improving

## 2023-01-17 ENCOUNTER — TRANSCRIPTION ENCOUNTER (OUTPATIENT)
Age: 73
End: 2023-01-17

## 2023-01-17 ENCOUNTER — EMERGENCY (EMERGENCY)
Facility: HOSPITAL | Age: 73
LOS: 1 days | Discharge: HOME | End: 2023-01-17
Attending: EMERGENCY MEDICINE
Payer: MEDICARE

## 2023-01-17 ENCOUNTER — NON-APPOINTMENT (OUTPATIENT)
Age: 73
End: 2023-01-17

## 2023-01-17 VITALS
HEIGHT: 62 IN | RESPIRATION RATE: 20 BRPM | DIASTOLIC BLOOD PRESSURE: 87 MMHG | WEIGHT: 154.98 LBS | OXYGEN SATURATION: 96 % | HEART RATE: 74 BPM | TEMPERATURE: 98 F | SYSTOLIC BLOOD PRESSURE: 136 MMHG

## 2023-01-17 VITALS
OXYGEN SATURATION: 100 % | TEMPERATURE: 97 F | HEART RATE: 61 BPM | RESPIRATION RATE: 19 BRPM | DIASTOLIC BLOOD PRESSURE: 65 MMHG | SYSTOLIC BLOOD PRESSURE: 115 MMHG

## 2023-01-17 PROCEDURE — 73070 X-RAY EXAM OF ELBOW: CPT

## 2023-01-17 PROCEDURE — 99285 EMERGENCY DEPT VISIT HI MDM: CPT | Mod: FS

## 2023-01-17 PROCEDURE — 73060 X-RAY EXAM OF HUMERUS: CPT | Mod: 26,RT

## 2023-01-17 PROCEDURE — 73060 X-RAY EXAM OF HUMERUS: CPT

## 2023-01-17 PROCEDURE — 99284 EMERGENCY DEPT VISIT MOD MDM: CPT

## 2023-01-17 PROCEDURE — 73030 X-RAY EXAM OF SHOULDER: CPT

## 2023-01-17 PROCEDURE — 73070 X-RAY EXAM OF ELBOW: CPT | Mod: 26,RT

## 2023-01-17 PROCEDURE — 73020 X-RAY EXAM OF SHOULDER: CPT | Mod: 26,59,RT

## 2023-01-17 PROCEDURE — 73020 X-RAY EXAM OF SHOULDER: CPT

## 2023-01-17 PROCEDURE — 73030 X-RAY EXAM OF SHOULDER: CPT | Mod: 26,RT

## 2023-01-17 RX ORDER — OXYCODONE HYDROCHLORIDE 5 MG/1
5 TABLET ORAL ONCE
Refills: 0 | Status: DISCONTINUED | OUTPATIENT
Start: 2023-01-17 | End: 2023-01-17

## 2023-01-17 RX ORDER — ACETAMINOPHEN 500 MG
975 TABLET ORAL ONCE
Refills: 0 | Status: COMPLETED | OUTPATIENT
Start: 2023-01-17 | End: 2023-01-17

## 2023-01-17 RX ADMIN — Medication 975 MILLIGRAM(S): at 08:50

## 2023-01-17 RX ADMIN — Medication 975 MILLIGRAM(S): at 09:37

## 2023-01-17 NOTE — ED ADULT NURSE NOTE - NSIMPLEMENTINTERV_GEN_ALL_ED
Implemented All Fall Risk Interventions:  [place] to call system. Call bell, personal items and telephone within reach. Instruct patient to call for assistance. Room bathroom lighting operational. Non-slip footwear when patient is off stretcher. Physically safe environment: no spills, clutter or unnecessary equipment. Stretcher in lowest position, wheels locked, appropriate side rails in place. Provide visual cue, wrist band, yellow gown, etc. Monitor gait and stability. Monitor for mental status changes and reorient to person, place, and time. Review medications for side effects contributing to fall risk. Reinforce activity limits and safety measures with patient and family.

## 2023-01-17 NOTE — ED PROVIDER NOTE - PHYSICAL EXAMINATION
CONSTITUTIONAL: Well appearing and in no apparent distress.  ENT: Airway patent, moist mucous membranes.   EYES: Pupils equal, round and reactive to light. EOMI. Conjunctiva normal appearing.   CARDIAC: Normal rate, regular rhythm.  Heart sounds S1, S2.    RESPIRATORY: Breath sounds clear and equal bilaterally.   GASTROINTESTINAL: Abdomen soft, non-tender, not distended.  MUSCULOSKELETAL: Spine appears normal.  Bandage removed from RUE. Moderate swelling noted to upper arm with associated bruising to medial and lateral aspect of extremity. No localized TTP. But decreased ROM 2/2 pain. Sensation and radial pulse intact. Arm warm to touch.  LUE WNL.    NEUROLOGICAL: Alert and oriented x3, no focal deficits, no motor or sensory deficits. 5/5 muscle strength throughout.  SKIN: Skin normal color, warm, dry and intact.   PSYCHIATRIC: Normal mood and affect.

## 2023-01-17 NOTE — ED PROVIDER NOTE - PROGRESS NOTE DETAILS
Spoke with ortho, pt cleared for discharged, placed in sling by them, recommended f/u with Dr. Aguirre in 1 week. Pt aware, agrees with plan.  Copy of results provided. Patient stable for discharge.  Follow up instructions given, return to ED precautions reviewed. Importance of follow up emphasized, patient verbalized understanding.  All questions answered. Toni Polanco PA-C

## 2023-01-17 NOTE — ED PROVIDER NOTE - CARE PLAN
1 Principal Discharge DX:	Humeral fracture   Principal Discharge DX:	Closed fracture of proximal end of right humerus

## 2023-01-17 NOTE — ED ADULT NURSE NOTE - CHIEF COMPLAINT QUOTE
Mechanical trip in fall while on Saturday in [facility]; injured right upper arm. Was told she requires surgery.

## 2023-01-17 NOTE — ED PROVIDER NOTE - CLINICAL SUMMARY MEDICAL DECISION MAKING FREE TEXT BOX
Dr. Romano (Attending Physician)  Pt. with fall and right shoulder/[place] Placed in splint at OSH. Splint removed. RP 2+ in right UE. radial median ulnar nerve distribution intact in b/L UEs. Unlikely dislocation since arm positioned across chest. Denies head trauma no evidence of head trauma on exam.. The patient’s cervical spine was clinically cleared using the NEXUS criteria: they have no focal neurologic deficit, no midline cervical spine tenderness is present, they have a normal level of consciousness and are not intoxicated, and no distracting injury is present. Will give pain control. Xray shoulder/humerus/elbow. Likely ortho consult.

## 2023-01-17 NOTE — ED PROVIDER NOTE - CARE PROVIDER_API CALL
Tyler Aguirre)  Orthopaedic Surgery  [address]  [place] [address]  Phone: [phone]  Fax: [phone]  Follow Up Time:

## 2023-01-17 NOTE — ED ADULT TRIAGE NOTE - CHIEF COMPLAINT QUOTE
Mechanical trip in fall while on Saturday in [facility]; injured right upper arm. Was told she requires surgery.

## 2023-01-17 NOTE — CONSULT NOTE ADULT - SUBJECTIVE AND OBJECTIVE BOX
72yFemal R hand dominiant  c/o R shoulder pain  s/p MF while in [place] on Saturdary 1/15. Patient denies head hit or LOC. Patient denies numbness or tingling in the RUE. Patient denies any other injuries.    ROS: 10 point ROS otherwise negative    PMH:  No pertinent past medical history      PSH:    AH:    Meds: See med rec    T(C): 36.8 (01-17-23 @ 08:55)  HR: 74 (01-17-23 @ 07:33)  BP: 123/75 (01-17-23 @ 08:55)  RR: 18 (01-17-23 @ 08:55)  SpO2: 100% (01-17-23 @ 08:55)  Wt(kg): --    Gen: NAD  Resp: Unlabored breathing  PE RUE:  Skin intact, minor ecchymosis alongside upper medial arm   SILT axillary/med/rad/ulnar  +Motor AIN/PIN/Ulnar/Radial/Musc/Median,   +painless elbow/wrist ROM,   shoulder ROM limited 2/2 pain,   2+radial pulse, soft compartments.    Secondary:  No TTP over bony landmarks, SILT BL, ROM intact BL, distal pulses palpable.    Imaging:  XR demonstrating right proximal humerus fracture        72yFemale with R proximal humerus fracture    pain control  NWB in sling  PT/OT  No acute orthopaedic intervention  Ortho to sign off  Please call if you have further question  Can follow up with Dr. Aguirre in 1-2 weeks or upon discharge    Ortho [id number]

## 2023-01-17 NOTE — ED PROVIDER NOTE - ATTENDING APP SHARED VISIT CONTRIBUTION OF CARE
Dr. Romano (Attending Physician)  I performed a history and physical exam of the patient and discussed their management with the advanced care provider. I reviewed the advanced care provider's note and agree with the documented findings and plan of care. My medical decision making and objective findings are found above.

## 2023-01-17 NOTE — ED PROVIDER NOTE - NSFOLLOWUPINSTRUCTIONS_ED_ALL_ED_FT
Keep arm in sling, do not bear weight on that extremity.  Tylenol/Motrin as needed for pain control.      Tyler Aguirre)  Orthopaedic Surgery  [address]  [place] [address]  Phone: [phone]  Fax: [phone]

## 2023-01-17 NOTE — ED PROVIDER NOTE - PATIENT PORTAL LINK FT
You can access the FollowMyHealth Patient Portal offered by [facility] by registering at the following website: [email or web address]. By joining [employer]’s FollowMyHealth portal, you will also be able to view your health information using other applications (apps) compatible with our system.

## 2023-01-17 NOTE — ED PROVIDER NOTE - OBJECTIVE STATEMENT
71 yo F with no reported PMH p/w RUE pain/swelling and bruising since mechanical fall on Saturday night. Pt was in [place], [place], sustained 71 yo F with no reported PMH p/w RUE pain/swelling and bruising since mechanical fall on Saturday night. Reports pain worse with moving extremity, better when still and supported by left arm. Pt was in [place], [place], sustained mechanical fall while [place]. Denies head trauma and LOC. Was taken by ambulance to ER in [place], states she had an xray done and was told she would need surgery. Was wrapped in webroll and ACE bandage. Returned Monday night to [place]. Here now for eval. Last took Motrin at 11 AM yesterday. Denies nausea, vomiting, fever, chills, chest pain, extremity weakness/paresthesias, neck pain, back pain, headache, dizziness. No AC or ASA use. R hand dominant.

## 2023-01-18 ENCOUNTER — TRANSCRIPTION ENCOUNTER (OUTPATIENT)
Age: 73
End: 2023-01-18

## 2023-01-18 PROBLEM — Z78.9 OTHER SPECIFIED HEALTH STATUS: Chronic | Status: ACTIVE | Noted: 2023-01-17

## 2023-01-19 ENCOUNTER — APPOINTMENT (OUTPATIENT)
Dept: ORTHOPEDIC SURGERY | Facility: CLINIC | Age: 73
End: 2023-01-19
Payer: MEDICARE

## 2023-01-19 VITALS — WEIGHT: 150 LBS | BODY MASS INDEX: 27.6 KG/M2 | HEIGHT: 62 IN

## 2023-01-19 PROCEDURE — 99203 OFFICE O/P NEW LOW 30 MIN: CPT

## 2023-01-20 NOTE — ADDENDUM
[FreeTextEntry1] : I, Brianna Estrada wrote this note acting as a scribe for Dr. Tyler Aguirre on Jan 19, 2023.

## 2023-01-20 NOTE — HISTORY OF PRESENT ILLNESS
[de-identified] : Pt is a 73 y/o RHD female with right proximal humerus fracture.  She tripped and fell while in [place] on Saturday 1/14/23.  SHe went to the ED there where she was told that she fractured the proximal humerus and that she would need surgery.  Her right arm was immobilized.  She returned to [place] and on Tuesday 1/17/23, she went to [facility] ED where new xrays were taken.  They revealed a displaced right proximal humerus fracture.  She was advised to follow up with a specialist.\par \par She is a .

## 2023-01-20 NOTE — END OF VISIT
[FreeTextEntry3] : All medical record entries made by the Scribe were at my,  Dr. Tyler Aguirre MD., direction and personally dictated by me on 01/19/2023. I have personally reviewed the chart and agree that the record accurately reflects my personal performance of the history, physical exam, assessment and plan.

## 2023-01-20 NOTE — DISCUSSION/SUMMARY
[de-identified] : The underlying pathophysiology was reviewed with the patient. XR films were reviewed with the patient. Discussed at length the nature of the patient’s condition. The right upper extremity symptoms appear secondary to proximal humerus fracture.\par \par At this time, we discussed treatment options consisting of operative and nonoperative management. I discussed with her and her  that the main deficit in injuries of this nature is limitation of overhead motion into flexion, which may vary to some extent dependent upon the positioning of her fracture. I did however tell them that the shoulder will never be normal and that without surgery, most patients end up with about 90° to 100° of active flexion. We reviewed the xrays from [facility] ED obtained two days ago in great detail. I told her that the displacement is within acceptable limits but if it were to displace further, ORIF would likely be necessitated unless she states otherwise. She is not consenting to surgery at this point in time and has deferred ORIF. In the interim, she will remain in the sling for support but as soon as gentle ROM becomes tolerable, she can begin pendulum exercises as well as elbow ROM, roughly after about 10 days. She was encouraged however to use the hand as tolerated for ADLs. She was instructed to sleep propped up in bed or in a recliner for comfort. I also recommended topical antiinflammatories, use of Lidocaine patches and NSAIDs prn.\par \par All questions answered, understanding verbalized. Patient in agreement with plan of c are. Follow up in 1 week for repeat xrays.

## 2023-01-20 NOTE — PHYSICAL EXAM
[de-identified] : Patient is WDWN, alert, and in no acute distress. Breathing is unlabored. She is grossly oriented to person, place, and time.\par \par She is accompanied by her  today.\par \par Right Upper Extremity:\par She presents immobilized in a sling, which she remained in for physical exam.\par ROM to the right shoulder not accessed given injury and pain.\par Sensation is intact along the deltoid.\par Mild to moderate edema noted to the dorsum of the hand.\par She has full digital motion, with intact sensation to light touch. [de-identified] : EXAM: XR SHOULDER AXILLARY 1 VIEW RT\par PROCEDURE DATE: 01/17/2023\par IMPRESSION:\par Redemonstrated acute comminuted fracture of the right humeral neck. No glenohumeral dislocation.\par \par ANU SANTO MD; Attending Radiologist\par This document has been electronically signed. Jan 17 2023 10:40AM\par \par ------------------------------------------------------------------------------------------------------------------------------------------------------------------------------------ \par \par EXAM: XR ELBOW 2 VIEWS RT\par EXAM: XR HUMERUS MIN 2 VIEWS RT\par EXAM: XR SHOULDER COMP MIN 2V RT\par PROCEDURE DATE: 01/17/2023\par IMPRESSION:\par Acute comminuted fracture of the right humeral neck with involvement of the greater tuberosity. There is one half shaft width anterior displacement of the humeral shaft. No glenohumeral dislocation. No advanced arthritic changes.\par \par ANU SANTO MD; Attending Radiologist\par This document has been electronically signed. Jan 17 2023 9:19AM

## 2023-01-24 ENCOUNTER — APPOINTMENT (OUTPATIENT)
Dept: ORTHOPEDIC SURGERY | Facility: CLINIC | Age: 73
End: 2023-01-24
Payer: MEDICARE

## 2023-01-24 DIAGNOSIS — S42.201A UNSPECIFIED FRACTURE OF UPPER END OF RIGHT HUMERUS, INITIAL ENCOUNTER FOR CLOSED FRACTURE: ICD-10-CM

## 2023-01-24 PROCEDURE — 73030 X-RAY EXAM OF SHOULDER: CPT | Mod: RT

## 2023-01-24 PROCEDURE — 99213 OFFICE O/P EST LOW 20 MIN: CPT

## 2023-01-24 NOTE — ADDENDUM
[FreeTextEntry1] : I, Brianna Estrada wrote this note acting as a scribe for Dr. Tyler Aguirre on Jan 24, 2023.

## 2023-01-24 NOTE — PHYSICAL EXAM
[de-identified] : Patient is WDWN, alert, and in no acute distress. Breathing is unlabored. She is grossly oriented to person, place, and time.\par \par She is accompanied by her  today.\par \par Right Upper Extremity:\par She presents immobilized in a sling, which she remained in for physical exam.\par ROM to the right shoulder not accessed given injury and pain.\par Sensation is intact along the deltoid.\par Mild to moderate edema noted to the dorsum of the hand.\par She has full digital motion, with intact sensation to light touch. [de-identified] : AP, transcapula, and axillary views of the RIGHT shoulder were obtained today and revealed a right humeral neck fracture. The fracture is comminuted and is about 60 percent translated. There is evidence of increased displacement seen on xrays today as compared to ER xrays on 1/17/23. \par \par ------------------------------------------------------------------------------------------------------------------------------------------------------------------------------------ \par \par EXAM: XR SHOULDER AXILLARY 1 VIEW RT\par PROCEDURE DATE: 01/17/2023\par IMPRESSION:\par Redemonstrated acute comminuted fracture of the right humeral neck. No glenohumeral dislocation.\par \par ANU SANTO MD; Attending Radiologist\par This document has been electronically signed. Jan 17 2023 10:40AM\par \par ------------------------------------------------------------------------------------------------------------------------------------------------------------------------------------ \par \par EXAM: XR ELBOW 2 VIEWS RT\par EXAM: XR HUMERUS MIN 2 VIEWS RT\par EXAM: XR SHOULDER COMP MIN 2V RT\par PROCEDURE DATE: 01/17/2023\par IMPRESSION:\par Acute comminuted fracture of the right humeral neck with involvement of the greater tuberosity. There is one half shaft width anterior displacement of the humeral shaft. No glenohumeral dislocation. No advanced arthritic changes.\par \par ANU SANTO MD; Attending Radiologist\par This document has been electronically signed. Jan 17 2023 9:19AM

## 2023-01-24 NOTE — HISTORY OF PRESENT ILLNESS
[de-identified] : Pt is a 73 y/o RHD female with right proximal humerus fracture.  She tripped and fell while in [place] on Saturday 1/14/23.  SHe went to the ED there where she was told that she fractured the proximal humerus and that she would need surgery.  Her right arm was immobilized.  She returned to [place] and on Tuesday 1/17/23, she went to [facility] ED where new xrays were taken.  They revealed a displaced right proximal humerus fracture.  She was initially seen in the office on 1/19/23 at which time she opted for nonoperative management. She returns for repeat xrays on 1/24/23 and is improving. She notes she had one night since last week where the pain was more severe. She takes Tylenol for pain as needed. \par \par She is a .

## 2023-01-24 NOTE — END OF VISIT
[FreeTextEntry3] : All medical record entries made by the Scribe were at my,  Dr. Tyler Aguirre MD., direction and personally dictated by me on 01/24/2023. I have personally reviewed the chart and agree that the record accurately reflects my personal performance of the history, physical exam, assessment and plan.

## 2023-01-24 NOTE — DISCUSSION/SUMMARY
[de-identified] : The underlying pathophysiology was reviewed with the patient. XR films were reviewed with the patient. Discussed at length the nature of the patient’s condition. The right upper extremity symptoms appear secondary to proximal humerus fracture.\par \par At this time, we again discussed treatment options consisting of operative and nonoperative management. We again also discussed that the main deficit in injuries of this nature is limitation of overhead motion . I did however tell them that the shoulder will never be normal and that without surgery, most patients end up with about 90° to 100° of active flexion if the fracture proceeds to full healing. We again reviewed the xrays from [facility] ED obtained in great detail as well as today's xrays. I told her that there is no guarantee that the fracture will not displace further over time. I did tell her and her  that the downside of continuing to wait on making a definitive decision about undergoing ORIF, is that she is currently not able to move the shoulder resulting in greater shoulder stiffness.However with ORIF, she would be able to begin to move almost immediately. She is not consenting to surgery at this point in time and has deferred ORIF. She stated she would like to continue nonoperatively .\par \par All questions answered, understanding verbalized. Patient in agreement with plan of care. Follow up in 2 days for repeat xrays. If she does change her mind and prefers to undergo ORIF of the right proximal humerus, prior, she was provided with the information of our surgical scheduler and was instructed to call the office.

## 2023-01-26 ENCOUNTER — APPOINTMENT (OUTPATIENT)
Dept: ORTHOPEDIC SURGERY | Facility: CLINIC | Age: 73
End: 2023-01-26

## 2023-02-27 ENCOUNTER — TRANSCRIPTION ENCOUNTER (OUTPATIENT)
Age: 73
End: 2023-02-27

## 2023-06-15 ENCOUNTER — APPOINTMENT (OUTPATIENT)
Dept: PULMONOLOGY | Facility: CLINIC | Age: 73
End: 2023-06-15
Payer: MEDICARE

## 2023-06-15 VITALS
DIASTOLIC BLOOD PRESSURE: 74 MMHG | SYSTOLIC BLOOD PRESSURE: 112 MMHG | TEMPERATURE: 97 F | WEIGHT: 155 LBS | BODY MASS INDEX: 28.52 KG/M2 | HEIGHT: 62 IN | HEART RATE: 93 BPM | OXYGEN SATURATION: 97 % | RESPIRATION RATE: 15 BRPM

## 2023-06-15 DIAGNOSIS — G47.33 OBSTRUCTIVE SLEEP APNEA (ADULT) (PEDIATRIC): ICD-10-CM

## 2023-06-15 PROCEDURE — 99214 OFFICE O/P EST MOD 30 MIN: CPT

## 2023-06-16 ENCOUNTER — TRANSCRIPTION ENCOUNTER (OUTPATIENT)
Age: 73
End: 2023-06-16

## 2023-06-18 PROBLEM — G47.33 OBSTRUCTIVE SLEEP APNEA, ADULT: Status: ACTIVE | Noted: 2021-09-03
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
## 2023-06-18 NOTE — REVIEW OF SYSTEMS
[Fatigue] : fatigue [Arthralgias] : arthralgias [Unintentional Sleep while inactive] : unintentional sleep while inactive [Frequent Nocturnal Awakenings] : no nocturnal awakenings from sleep [Daytime Somnolence: ESS=____] : no daytime somnolence [Unintentional Sleep while active] : no unintentional sleep while active [Awakes Unrefreshed] : restorative sleep [Awakes With Headache] : awakes without a headache [Awakes With Dry Mouth] : awakes without dry mouth [FreeTextEntry1] : 1 AM [FreeTextEntry2] : 7 - 7:30 AM, until 9 am on the days she is off  [FreeTextEntry3] : "very fast" [FreeTextEntry4] : 1-2 x to fix the mask  [FreeTextEntry6] : 5-6- hours [FreeTextEntry5] : "quick" [FreeTextEntry7] : occasional, but tries not to stating she cannot sleep at night if she does.  [Snoring] : snoring [Recent Wt Gain (___ Lbs)] : no recent weight gain [Recent Wt Loss (___ Lbs)] : no recent weight loss [Nasal Congestion] : no nasal congestion [Postnasal Drip] : no postnasal drip [Witnessed Apneas] : no witnessed apnea [Shortness Of Breath] : no shortness of breath [A.M. Dry Mouth] : no a.m. dry mouth [Chest Pain] : no chest pain [CHF] : no congestive heart failure [Thyroid Disease] : no thyroid disease [Diabetes] : no diabetes  [History of Iron Deficiency] : no history of iron deficiency [A.M. Headache] : no headache present upon awakening [Heartburn] : no heartburn [Depression] : no depression [Nocturia] : no nocturia [Anxious] : not anxious
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
## 2023-06-18 NOTE — PHYSICAL EXAM
[Normal Appearance] : normal appearance [General Appearance - In No Acute Distress] : no acute distress [IV] : IV [Neck Appearance] : the appearance of the neck was normal [Heart Rate And Rhythm] : heart rate was normal and rhythm regular [Heart Sounds] : normal S1 and S2 [Murmurs] : no murmurs [] : no respiratory distress [Respiration, Rhythm And Depth] : normal respiratory rhythm and effort [Auscultation Breath Sounds / Voice Sounds] : lungs were clear to auscultation bilaterally [Exaggerated Use Of Accessory Muscles For Inspiration] : no accessory muscle use [Involuntary Movements] : no involuntary movements were seen [No Focal Deficits] : no focal deficits [Oriented To Time, Place, And Person] : oriented to person, place, and time [Impaired Insight] : insight and judgment were intact [Affect] : the affect was normal [Mood] : the mood was normal [Normal Conjunctiva] : the conjunctiva exhibited no abnormalities [Cyanosis, Localized] : no localized cyanosis

## 2023-07-31 DIAGNOSIS — D72.819 DECREASED WHITE BLOOD CELL COUNT, UNSPECIFIED: ICD-10-CM

## 2023-07-31 DIAGNOSIS — R05.9 COUGH, UNSPECIFIED: ICD-10-CM

## 2023-07-31 DIAGNOSIS — M81.0 AGE-RELATED OSTEOPOROSIS W/OUT CURRENT PATHOLOGICAL FRACTURE: ICD-10-CM

## 2023-08-02 ENCOUNTER — RX RENEWAL (OUTPATIENT)
Age: 73
End: 2023-08-02

## 2023-08-09 ENCOUNTER — LABORATORY RESULT (OUTPATIENT)
Age: 73
End: 2023-08-09

## 2023-08-28 ENCOUNTER — NON-APPOINTMENT (OUTPATIENT)
Age: 73
End: 2023-08-28

## 2023-08-28 ENCOUNTER — APPOINTMENT (OUTPATIENT)
Dept: INTERNAL MEDICINE | Facility: CLINIC | Age: 73
End: 2023-08-28
Payer: MEDICARE

## 2023-08-28 VITALS
TEMPERATURE: 97.8 F | WEIGHT: 151 LBS | HEIGHT: 62 IN | BODY MASS INDEX: 27.79 KG/M2 | HEART RATE: 73 BPM | OXYGEN SATURATION: 98 %

## 2023-08-28 VITALS — DIASTOLIC BLOOD PRESSURE: 60 MMHG | SYSTOLIC BLOOD PRESSURE: 110 MMHG

## 2023-08-28 DIAGNOSIS — R92.2 INCONCLUSIVE MAMMOGRAM: ICD-10-CM

## 2023-08-28 DIAGNOSIS — E78.5 HYPERLIPIDEMIA, UNSPECIFIED: ICD-10-CM

## 2023-08-28 DIAGNOSIS — Z00.00 ENCOUNTER FOR GENERAL ADULT MEDICAL EXAMINATION W/OUT ABNORMAL FINDINGS: ICD-10-CM

## 2023-08-28 DIAGNOSIS — R26.89 OTHER ABNORMALITIES OF GAIT AND MOBILITY: ICD-10-CM

## 2023-08-28 PROCEDURE — 93000 ELECTROCARDIOGRAM COMPLETE: CPT

## 2023-08-28 PROCEDURE — G0439: CPT

## 2023-08-28 RX ORDER — AZITHROMYCIN 250 MG/1
250 TABLET, FILM COATED ORAL
Qty: 6 | Refills: 0 | Status: DISCONTINUED | COMMUNITY
Start: 2023-07-31 | End: 2023-08-28

## 2023-08-28 NOTE — PHYSICAL EXAM
[No Acute Distress] : no acute distress [Well Nourished] : well nourished [Well Developed] : well developed [Well-Appearing] : well-appearing [Normal Sclera/Conjunctiva] : normal sclera/conjunctiva [PERRL] : pupils equal round and reactive to light [EOMI] : extraocular movements intact [Normal Outer Ear/Nose] : the outer ears and nose were normal in appearance [Normal Oropharynx] : the oropharynx was normal [No JVD] : no jugular venous distention [No Lymphadenopathy] : no lymphadenopathy [Supple] : supple [Thyroid Normal, No Nodules] : the thyroid was normal and there were no nodules present [No Respiratory Distress] : no respiratory distress  [No Accessory Muscle Use] : no accessory muscle use [Clear to Auscultation] : lungs were clear to auscultation bilaterally [Normal Rate] : normal rate  [Regular Rhythm] : with a regular rhythm [Normal S1, S2] : normal S1 and S2 [No Murmur] : no murmur heard [No Carotid Bruits] : no carotid bruits [No Abdominal Bruit] : a ~M bruit was not heard ~T in the abdomen [No Varicosities] : no varicosities [Pedal Pulses Present] : the pedal pulses are present [No Edema] : there was no peripheral edema [No Palpable Aorta] : no palpable aorta [No Extremity Clubbing/Cyanosis] : no extremity clubbing/cyanosis [Normal Appearance] : normal in appearance [No Nipple Discharge] : no nipple discharge [No Axillary Lymphadenopathy] : no axillary lymphadenopathy [Soft] : abdomen soft [Non Tender] : non-tender [Non-distended] : non-distended [No Masses] : no abdominal mass palpated [No HSM] : no HSM [Normal Bowel Sounds] : normal bowel sounds [Normal Posterior Cervical Nodes] : no posterior cervical lymphadenopathy [Normal Anterior Cervical Nodes] : no anterior cervical lymphadenopathy [No CVA Tenderness] : no CVA  tenderness [No Spinal Tenderness] : no spinal tenderness [No Joint Swelling] : no joint swelling [Grossly Normal Strength/Tone] : grossly normal strength/tone [No Rash] : no rash [Coordination Grossly Intact] : coordination grossly intact [No Focal Deficits] : no focal deficits [Normal Gait] : normal gait [Deep Tendon Reflexes (DTR)] : deep tendon reflexes were 2+ and symmetric [Normal Affect] : the affect was normal [Normal Insight/Judgement] : insight and judgment were intact [de-identified] : R>L breast size

## 2023-08-28 NOTE — HEALTH RISK ASSESSMENT
[No] : No [0] : 2) Feeling down, depressed, or hopeless: Not at all (0) [[id number]] : 0 [Fully functional (bathing, dressing, toileting, transferring, walking, feeding)] : Fully functional (bathing, dressing, toileting, transferring, walking, feeding) [Fully functional (using the telephone, shopping, preparing meals, housekeeping, doing laundry, using] : Fully functional and needs no help or supervision to perform IADLs (using the telephone, shopping, preparing meals, housekeeping, doing laundry, using transportation, managing medications and managing finances) [Reports changes in hearing] : Reports no changes in hearing [Reports changes in vision] : Reports no changes in vision [Reports changes in dental health] : Reports no changes in dental health

## 2023-08-28 NOTE — HISTORY OF PRESENT ILLNESS
[FreeTextEntry1] : The patient is here for a routine visit.  [de-identified] : The previous RUE injury has essentially healed.  She has a slightly decreased ROM.  She doesn't exercise much.  Her diet is ok.  She is seeing her gastroenterologist, Dr. Green, for loose stools.  It is somewhat improved.

## 2023-09-08 ENCOUNTER — NON-APPOINTMENT (OUTPATIENT)
Age: 73
End: 2023-09-08

## 2023-10-09 NOTE — ED ADULT NURSE NOTE - OBJECTIVE STATEMENT
What Type Of Note Output Would You Prefer (Optional)?: Bullet Format How Severe Is Your Rash?: moderate Is This A New Presentation, Or A Follow-Up?: Rash Pt is 71 y/o Female ambulatory from triage with  complaining of fall. Pt presenting to the ED complaining of right arm pain due to a fall on Saturday night.  PT A&O x4. Pt states she was in [place] and fell on the street on her upper right arm. Pt states she was laying on the street for 3 hours before she was taken by ambulance to the hospital in [place]. Pt's right humerus is bruised and the arm is contracted. Pt rates pain 2/10 at present. Pt has +2 bilateral pulses and 2 sec cap refill. Pt is able to move arm, but states it is painful to move.  Pt is not on blood thinners and has no PMH but endorses that she "tends to fall." Pt denies N/V, diarrhea, fever, chills, and dizziness. Safety and comfort provided Pt is 73 y/o Female ambulatory from triage with  complaining of fall. Pt presenting to the ED complaining of right arm pain due to a fall on Saturday night.  PT A&O x4. Pt states she was in [place] and fell on the street on her upper right arm. Pt states she was laying on the street for 3 hours before she was taken by ambulance to the hospital in [place]. Pt's right humerus is bruised and the arm is contracted. Pt rates pain 2/10 at present. Pt has +2 bilateral pulses and 2 sec cap refill. Pt is able to move arm, but states it is painful to move.  Pt is not on blood thinners and has no PMH but endorses that she has "poor balance." Pt denies N/V, diarrhea, fever, chills, and dizziness. Safety and comfort provided

## 2024-01-21 ENCOUNTER — NON-APPOINTMENT (OUTPATIENT)
Age: 74
End: 2024-01-21

## 2024-04-01 ENCOUNTER — RX RENEWAL (OUTPATIENT)
Age: 74
End: 2024-04-01

## 2024-04-01 RX ORDER — ATORVASTATIN CALCIUM 10 MG/1
10 TABLET, FILM COATED ORAL
Qty: 90 | Refills: 3 | Status: ACTIVE | COMMUNITY
Start: 2022-09-09 | End: 1900-01-01

## 2024-06-28 DIAGNOSIS — M54.9 DORSALGIA, UNSPECIFIED: ICD-10-CM

## 2025-01-23 NOTE — ASSESSMENT
[FreeTextEntry1] : Discussed diet, exercise, weight loss.  Lipids good on Atorvastatin.  HgBA1c 5.7  Mammogram and breast U/S overdue and advised.  Routine Gyn visit advised.  She has seen an endocrinologist, Dr. Archibald.  She was briefly on Prolia but she decided not to continue.  She had a DEXA there.  She declines treatment now.    She has had a few falls and balance an PT advised.  COVID-19 new booster advised when availale.  She refuses other vaccines.  She sees a gastroenterologist, Dr. Green.  She had a colonoscopy in the last 1-2 years and she was advised to have the report sent.
yes
No

## 2025-03-31 ENCOUNTER — EMERGENCY (EMERGENCY)
Facility: HOSPITAL | Age: 75
LOS: 1 days | Discharge: HOME | End: 2025-03-31
Attending: EMERGENCY MEDICINE | Admitting: EMERGENCY MEDICINE
Payer: MEDICARE

## 2025-03-31 VITALS
RESPIRATION RATE: 18 BRPM | HEIGHT: 65 IN | OXYGEN SATURATION: 96 % | SYSTOLIC BLOOD PRESSURE: 120 MMHG | HEART RATE: 74 BPM | DIASTOLIC BLOOD PRESSURE: 76 MMHG | WEIGHT: 154.98 LBS

## 2025-03-31 PROCEDURE — 99283 EMERGENCY DEPT VISIT LOW MDM: CPT | Mod: 25

## 2025-03-31 PROCEDURE — 96372 THER/PROPH/DIAG INJ SC/IM: CPT

## 2025-03-31 PROCEDURE — 99284 EMERGENCY DEPT VISIT MOD MDM: CPT

## 2025-03-31 RX ORDER — CYCLOBENZAPRINE HYDROCHLORIDE 15 MG/1
1 CAPSULE, EXTENDED RELEASE ORAL
Qty: 15 | Refills: 0
Start: 2025-03-31 | End: 2025-04-04

## 2025-03-31 RX ORDER — KETOROLAC TROMETHAMINE 30 MG/ML
30 INJECTION, SOLUTION INTRAMUSCULAR; INTRAVENOUS ONCE
Refills: 0 | Status: DISCONTINUED | OUTPATIENT
Start: 2025-03-31 | End: 2025-03-31

## 2025-03-31 RX ADMIN — KETOROLAC TROMETHAMINE 30 MILLIGRAM(S): 30 INJECTION, SOLUTION INTRAMUSCULAR; INTRAVENOUS at 09:13

## 2025-03-31 RX ADMIN — KETOROLAC TROMETHAMINE 30 MILLIGRAM(S): 30 INJECTION, SOLUTION INTRAMUSCULAR; INTRAVENOUS at 09:24

## 2025-03-31 NOTE — ED PROVIDER NOTE - PATIENT PORTAL LINK FT
You can access the FollowMyHealth Patient Portal offered by [facility] by registering at the following website: [email or web address]. By joining [employer]’s FollowMyHealth portal, you will also be able to view your health information using other applications (apps) compatible with our system.

## 2025-03-31 NOTE — ED ADULT NURSE NOTE - OBJECTIVE STATEMENT
Pt comes in for worsening pain on the left lower leg  - radiating to left ankle . No recent hx of fall or trauma

## 2025-03-31 NOTE — ED ADULT NURSE NOTE - NS_SISCREENINGSR_GEN_ALL_ED
Cranial shape/Carey(s) - size and tension/Scalp free of abrasions, defects, masses and swelling/Hair pattern normal
Negative

## 2025-04-01 ENCOUNTER — APPOINTMENT (OUTPATIENT)
Dept: ORTHOPEDIC SURGERY | Facility: CLINIC | Age: 75
End: 2025-04-01
Payer: MEDICARE

## 2025-04-01 VITALS — WEIGHT: 155 LBS | BODY MASS INDEX: 28.52 KG/M2 | HEIGHT: 62 IN

## 2025-04-01 DIAGNOSIS — M54.16 RADICULOPATHY, LUMBAR REGION: ICD-10-CM

## 2025-04-01 DIAGNOSIS — M81.0 AGE-RELATED OSTEOPOROSIS W/OUT CURRENT PATHOLOGICAL FRACTURE: ICD-10-CM

## 2025-04-01 DIAGNOSIS — E78.00 PURE HYPERCHOLESTEROLEMIA, UNSPECIFIED: ICD-10-CM

## 2025-04-01 DIAGNOSIS — M41.50 OTHER SECONDARY SCOLIOSIS, SITE UNSPECIFIED: ICD-10-CM

## 2025-04-01 DIAGNOSIS — M51.361: ICD-10-CM

## 2025-04-01 DIAGNOSIS — M47.816 SPONDYLOSIS W/OUT MYELOPATHY OR RADICULOPATHY, LUMBAR REGION: ICD-10-CM

## 2025-04-01 DIAGNOSIS — Z86.69 PERSONAL HISTORY OF OTHER DISEASES OF THE NERVOUS SYSTEM AND SENSE ORGANS: ICD-10-CM

## 2025-04-01 PROCEDURE — 73610 X-RAY EXAM OF ANKLE: CPT | Mod: LT

## 2025-04-01 PROCEDURE — 99213 OFFICE O/P EST LOW 20 MIN: CPT

## 2025-04-01 PROCEDURE — 72100 X-RAY EXAM L-S SPINE 2/3 VWS: CPT

## 2025-04-01 RX ORDER — METHYLPREDNISOLONE 4 MG/1
4 TABLET ORAL
Qty: 1 | Refills: 1 | Status: ACTIVE | COMMUNITY
Start: 2025-04-01 | End: 1900-01-01

## 2025-04-03 ENCOUNTER — RX RENEWAL (OUTPATIENT)
Age: 75
End: 2025-04-03

## 2025-05-06 NOTE — END OF VISIT
PHYSICAL THERAPY - DAILY TREATMENT NOTE (updated 3/23)      Date: 2025          Patient Name:  Saravanan Cristina :  1982   Medical   Diagnosis:  Right ankle pain, unspecified chronicity [M25.571]  Tarsal tunnel syndrome of right side [G57.51] Treatment Diagnosis:  M25.571 RIGHT ANKLE PAIN and pain in the joint of the right foot     Referral Source:  Rene Poole DPM Insurance:   Payor: [facility] [employer] [facility] / Plan: [employer] [facility] / Product Type: *No Product type* /                     Patient  verified yes     Visit #   Current  / Total 1 15   Time   In / Out 9:30 10:15   Total Treatment Time 45   Total Timed Codes 41         SUBJECTIVE    Pain Level (0-10 scale): 5    Any medication changes, allergies to medications, adverse drug reactions, diagnosis change, or new procedure performed?: [x] No    [] Yes (see summary sheet for update)  Medications: Verified on Patient Summary List    Subjective functional status/changes:     \"I need to go see a neurologist, MRI was bad.\"    OBJECTIVE      Therapeutic Procedures:  Tx Min Billable or 1:1 Min (if diff from Tx Min) Procedure, Rationale, Specifics   41  [id number] Therapeutic Exercise (timed):  increase ROM, strength, coordination, balance, and proprioception to improve patient's ability to progress to PLOF and address remaining functional goals. (see flow sheet as applicable)     Details if applicable:       54075 Neuromuscular Re-Education (timed):  improve balance, coordination, kinesthetic sense, posture, core stability and proprioception to improve patient's ability to develop conscious control of individual muscles and awareness of position of extremities in order to progress to PLOF and address remaining functional goals. (see flow sheet as applicable)     Details if applicable:       00880 Manual Therapy (timed):  decrease pain, increase ROM, and increase tissue extensibility to improve patient's ability to progress to PLOF and 
[Time Spent: ___ minutes] : I have spent [unfilled] minutes of time on the encounter.

## 2025-05-28 DIAGNOSIS — D72.819 DECREASED WHITE BLOOD CELL COUNT, UNSPECIFIED: ICD-10-CM

## 2025-05-28 DIAGNOSIS — Z87.898 PERSONAL HISTORY OF OTHER SPECIFIED CONDITIONS: ICD-10-CM

## 2025-06-05 ENCOUNTER — APPOINTMENT (OUTPATIENT)
Dept: INTERNAL MEDICINE | Facility: CLINIC | Age: 75
End: 2025-06-05
Payer: MEDICARE

## 2025-06-05 ENCOUNTER — NON-APPOINTMENT (OUTPATIENT)
Age: 75
End: 2025-06-05

## 2025-06-05 VITALS
OXYGEN SATURATION: 97 % | HEIGHT: 62 IN | WEIGHT: 155 LBS | HEART RATE: 91 BPM | TEMPERATURE: 97 F | BODY MASS INDEX: 28.52 KG/M2

## 2025-06-05 VITALS — DIASTOLIC BLOOD PRESSURE: 75 MMHG | SYSTOLIC BLOOD PRESSURE: 125 MMHG

## 2025-06-05 DIAGNOSIS — G47.33 OBSTRUCTIVE SLEEP APNEA (ADULT) (PEDIATRIC): ICD-10-CM

## 2025-06-05 DIAGNOSIS — M81.0 AGE-RELATED OSTEOPOROSIS W/OUT CURRENT PATHOLOGICAL FRACTURE: ICD-10-CM

## 2025-06-05 DIAGNOSIS — Z00.00 ENCOUNTER FOR GENERAL ADULT MEDICAL EXAMINATION W/OUT ABNORMAL FINDINGS: ICD-10-CM

## 2025-06-05 DIAGNOSIS — E78.5 HYPERLIPIDEMIA, UNSPECIFIED: ICD-10-CM

## 2025-06-05 PROCEDURE — 93000 ELECTROCARDIOGRAM COMPLETE: CPT

## 2025-06-05 PROCEDURE — G0439: CPT

## 2025-06-09 ENCOUNTER — NON-APPOINTMENT (OUTPATIENT)
Age: 75
End: 2025-06-09

## 2025-08-11 ENCOUNTER — RX RENEWAL (OUTPATIENT)
Age: 75
End: 2025-08-11

## 2025-08-21 ENCOUNTER — APPOINTMENT (OUTPATIENT)
Dept: PULMONOLOGY | Facility: CLINIC | Age: 75
End: 2025-08-21